# Patient Record
Sex: MALE | Race: WHITE | NOT HISPANIC OR LATINO | Employment: FULL TIME | URBAN - METROPOLITAN AREA
[De-identification: names, ages, dates, MRNs, and addresses within clinical notes are randomized per-mention and may not be internally consistent; named-entity substitution may affect disease eponyms.]

---

## 2018-02-08 ENCOUNTER — OFFICE VISIT (OUTPATIENT)
Dept: FAMILY MEDICINE | Facility: CLINIC | Age: 68
End: 2018-02-08
Payer: COMMERCIAL

## 2018-02-08 VITALS
HEIGHT: 72 IN | BODY MASS INDEX: 22.92 KG/M2 | HEART RATE: 60 BPM | OXYGEN SATURATION: 100 % | DIASTOLIC BLOOD PRESSURE: 68 MMHG | RESPIRATION RATE: 20 BRPM | WEIGHT: 169.25 LBS | SYSTOLIC BLOOD PRESSURE: 123 MMHG | TEMPERATURE: 98.6 F

## 2018-02-08 DIAGNOSIS — Z13.1 SCREENING FOR DIABETES MELLITUS: ICD-10-CM

## 2018-02-08 DIAGNOSIS — Z71.89 ADVANCE CARE PLANNING: ICD-10-CM

## 2018-02-08 DIAGNOSIS — Z13.220 LIPID SCREENING: ICD-10-CM

## 2018-02-08 DIAGNOSIS — Z00.00 ROUTINE GENERAL MEDICAL EXAMINATION AT A HEALTH CARE FACILITY: Primary | ICD-10-CM

## 2018-02-08 DIAGNOSIS — D12.5 ADENOMATOUS POLYP OF SIGMOID COLON: ICD-10-CM

## 2018-02-08 LAB
ANION GAP SERPL CALCULATED.3IONS-SCNC: 6 MMOL/L (ref 3–14)
BUN SERPL-MCNC: 13 MG/DL (ref 7–30)
CALCIUM SERPL-MCNC: 8.9 MG/DL (ref 8.5–10.1)
CHLORIDE SERPL-SCNC: 103 MMOL/L (ref 94–109)
CHOLEST SERPL-MCNC: 210 MG/DL
CO2 SERPL-SCNC: 28 MMOL/L (ref 20–32)
CREAT SERPL-MCNC: 0.71 MG/DL (ref 0.66–1.25)
GFR SERPL CREATININE-BSD FRML MDRD: >90 ML/MIN/1.7M2
GLUCOSE SERPL-MCNC: 84 MG/DL (ref 70–99)
HDLC SERPL-MCNC: 85 MG/DL
LDLC SERPL CALC-MCNC: 113 MG/DL
NONHDLC SERPL-MCNC: 125 MG/DL
POTASSIUM SERPL-SCNC: 3.4 MMOL/L (ref 3.4–5.3)
SODIUM SERPL-SCNC: 137 MMOL/L (ref 133–144)
TRIGL SERPL-MCNC: 60 MG/DL

## 2018-02-08 PROCEDURE — 80061 LIPID PANEL: CPT | Performed by: FAMILY MEDICINE

## 2018-02-08 PROCEDURE — 80048 BASIC METABOLIC PNL TOTAL CA: CPT | Performed by: FAMILY MEDICINE

## 2018-02-08 PROCEDURE — 99387 INIT PM E/M NEW PAT 65+ YRS: CPT | Performed by: FAMILY MEDICINE

## 2018-02-08 PROCEDURE — 36415 COLL VENOUS BLD VENIPUNCTURE: CPT | Performed by: FAMILY MEDICINE

## 2018-02-08 RX ORDER — MULTIPLE VITAMINS W/ MINERALS TAB 9MG-400MCG
1 TAB ORAL DAILY
COMMUNITY
Start: 2018-02-08

## 2018-02-08 ASSESSMENT — ACTIVITIES OF DAILY LIVING (ADL)
CURRENT_FUNCTION: NO ASSISTANCE NEEDED
I_NEED_ASSISTANCE_FOR_THE_FOLLOWING_DAILY_ACTIVITIES:: NO ASSISTANCE IS NEEDED

## 2018-02-08 NOTE — PROGRESS NOTES
SUBJECTIVE:   Noah Wells is a 67 year old male who presents for Preventive Visit.    Are you in the first 12 months of your Medicare Part B coverage?  No    Healthy Habits:  Answers for HPI/ROS submitted by the patient on 2/8/2018   Annual Exam:  Getting at least 3 servings of Calcium per day:: Yes  Bi-annual eye exam:: NO  Dental care twice a year:: Yes  Sleep apnea or symptoms of sleep apnea:: None  Diet:: Regular (no restrictions)  Frequency of exercise:: 4-5 days/week  Taking medications regularly:: Yes  Medication side effects:: Not applicable  Additional concerns today:: No  Activities of Daily Living: no assistance needed  Home safety: no safety concerns identified  Hearing Impairment:: no hearing concerns  PHQ-2 Score: 0  Duration of exercise:: 45-60 minutes      Fallen 2 or more times in the past year?: Yes (due biking )  Any fall with injury in the past year?: No  Timed Up and Go Test (>13.5 is fall risk; contact physician) : 7        COGNITIVE SCREEN  1) Repeat 3 items (Banana, Sunrise, Chair)    2) Clock draw: NORMAL  3) 3 item recall: Recalls 2 objects   Results: NORMAL clock, 1-2 items recalled: COGNITIVE IMPAIRMENT LESS LIKELY    Mini-CogTM Copyright S Yolanda. Licensed by the author for use in [facility]; reprinted with permission ([email or web address]). All rights reserved.            Reviewed and updated as needed this visit by clinical staff  Tobacco  Allergies  Meds  Problems  Med Hx  Surg Hx  Fam Hx  Soc Hx          Reviewed and updated as needed this visit by Provider  Tobacco  Problems  Med Hx  Surg Hx  Fam Hx  Soc Hx       Social History   Substance Use Topics     Smoking status: Former Smoker     Packs/day: 1.00     Years: 12.00     Quit date: 8/29/1988     Smokeless tobacco: Former User     Alcohol use Yes      Comment: occ       If you drink alcohol do you typically have >3 drinks per day or >7 drinks per week? No                        Today's PHQ-2 Score:   PHQ-2  ( 1999 Pfizer) 2/8/2018 2/8/2018   Q1: Little interest or pleasure in doing things 0 0   Q2: Feeling down, depressed or hopeless 0 0   PHQ-2 Score 0 0   Q1: Little interest or pleasure in doing things Not at all -   Q2: Feeling down, depressed or hopeless Not at all -   PHQ-2 Score 0 -       Do you feel safe in your environment - Yes    Do you have a Health Care Directive?: No: Advance care planning reviewed with patient; information given to patient to review.    Current providers sharing in care for this patient include:   Patient Care Team:  Kassie Ríos MD as PCP - General (Family Practice)    The following health maintenance items are reviewed in Epic and correct as of today:  Health Maintenance   Topic Date Due     ADVANCE DIRECTIVE PLANNING Q5 YRS  06/09/2004     FALL RISK ASSESSMENT  06/09/2014     PNEUMOCOCCAL (1 of 2 - PCV13) 06/09/2014     AORTIC ANEURYSM SCREENING (SYSTEM ASSIGNED)  06/09/2014     INFLUENZA VACCINE (SYSTEM ASSIGNED)  09/01/2017     LIPID SCREEN Q5 YR MALE (SYSTEM ASSIGNED)  08/29/2018     COLONOSCOPY Q5 YR  10/11/2018     TETANUS IMMUNIZATION (SYSTEM ASSIGNED)  08/29/2023     HEPATITIS C SCREENING  Completed         ROS:  CONST: NEGATIVE for fevers/chills/sweats, unexplained weight loss/gain, and fatigue  EYES: POSITIVE for change in vision  ENT: NEGATIVE for difficulty hearing/tinnitus, and problems with teeth/gums  BREAST: NEGATIVE for breast lump/discharge  CV: NEGATIVE for chest pain/discomfort, leg pain with exercise, and palpitations  RESP: NEGATIVE for cough/wheeze, and difficulty breathing  GI: NEGATIVE for abdominal pain, blood in bowel movement, and nausea/vomiting/diarrhea  : POSITIVE for nighttime urination (once/noc - chronic and stable), NEGATIVE for leaking urine, penile discharge, and concerns about sexual function  MS: NEGATIVE for muscle/joint pain  SKIN: NEGATIVE for rash or mole change  NEURO: NEGATIVE for headache, dizziness/lightheadedness, numbness, memory  loss, and loss of coordination  PSYCH: NEGATIVE for anxiety/stress, problems with sleep, and depression  HEME: NEGATIVE for unexplained lumps, and easy bruising/bleeding  ENDO: NEGATIVE for excessive thirst or urination  ALL: NEGATIVE for hay fever/allergies     OBJECTIVE:   /68 (BP Location: Right arm, Patient Position: Chair, Cuff Size: Adult Regular)  Pulse 60  Temp 98.6  F (37  C) (Oral)  Resp 20  Ht 6' (1.829 m)  Wt 169 lb 4 oz (76.8 kg)  SpO2 100%  BMI 22.95 kg/m2 Estimated body mass index is 22.95 kg/(m^2) as calculated from the following:    Height as of this encounter: 6' (1.829 m).    Weight as of this encounter: 169 lb 4 oz (76.8 kg).  EXAM:   GENERAL: healthy, alert and no distress  EYES: Eyes grossly normal to inspection, PERRL and conjunctivae and sclerae normal  HENT: ear canals and TM's normal, nose and mouth without ulcers or lesions  NECK: no adenopathy, no asymmetry, masses, or scars and thyroid normal to palpation  RESP: lungs clear to auscultation - no rales, rhonchi or wheezes  CV: regular rate and rhythm, normal S1 S2, no S3 or S4, no murmur, click or rub, no peripheral edema and peripheral pulses strong  ABDOMEN: soft, nontender, no hepatosplenomegaly, no masses and bowel sounds normal   (male): normal male genitalia without lesions or urethral discharge, no hernia  MS: no gross musculoskeletal defects noted, no edema  SKIN: no suspicious lesions or rashes  NEURO: Normal strength and tone, mentation intact and speech normal  PSYCH: mentation appears normal, affect normal/bright    ASSESSMENT / PLAN:     1. Routine general medical examination at a health care facility  He's in excellent physical condition, biking all over for transportation.  I recommended Prevnar which he declined.  Recommended formal eye exam    2. Lipid screening  - Lipid panel reflex to direct LDL Fasting    3. Screening for diabetes mellitus  - Basic metabolic panel    4. Adenomatous polyp of sigmoid  colon  Due for repeat colonoscopy this year.  - COLORECTAL SURGERY REFERRAL    5. Advance care planning  Discussed and recommended Health Care Directive.           End of Life Planning:  Patient currently has an advanced directive: No.  I have verified the patient's ablity to prepare an advanced directive/make health care decisions.  Literature was provided to assist patient in preparing an advanced directive.      COUNSELING:  Reviewed preventive health counseling, as reflected in patient instructions        BP Screening:   Last 3 BP Readings:    BP Readings from Last 3 Encounters:   02/08/18 123/68   09/26/13 127/68   08/29/13 120/72   The following was recommended to the patient:  Re-screen BP within a year and recommended lifestyle modifications        Appropriate preventive services were discussed with this patient, including applicable screening as appropriate for cardiovascular disease, diabetes, osteopenia/osteoporosis, and glaucoma.  As appropriate for age/gender, discussed screening for colorectal cancer, prostate cancer, breast cancer, and cervical cancer. Checklist reviewing preventive services available has been given to the patient.    Reviewed patients plan of care and provided an AVS. The Basic Care Plan (routine screening as documented in Health Maintenance) for Noah meets the Care Plan requirement. This Care Plan has been established and reviewed with the Patient.    Counseling Resources:  ATP IV Guidelines  Pooled Cohorts Equation Calculator  Breast Cancer Risk Calculator  FRAX Risk Assessment  ICSI Preventive Guidelines  Dietary Guidelines for Americans, 2010  USDA's MyPlate  ASA Prophylaxis  Lung CA Screening    Kassie Ríos MD  [facility]

## 2018-02-08 NOTE — MR AVS SNAPSHOT
After Visit Summary   2/8/2018    Noah Wells    MRN: [id number]           Patient Information     Date Of Birth          1950        Visit Information        Provider Department      2/8/2018 9:00 AM Kassie Ríos MD [facility]        Today's Diagnoses     Routine general medical examination at a health care facility    -  1    Lipid screening        Screening for diabetes mellitus        Adenomatous polyp of sigmoid colon          Care Instructions      Preventive Health Recommendations:       Male Ages 65 and over    Yearly exam:             See your health care provider every year in order to  o   Review health changes.   o   Discuss preventive care.    o   Review your medicines if your doctor has prescribed any.    Talk with your health care provider about whether you should have a test to screen for prostate cancer (PSA).    Every 3 years, have a diabetes test (fasting glucose). If you are at risk for diabetes, you should have this test more often.    Every 5 years, have a cholesterol test. Have this test more often if you are at risk for high cholesterol or heart disease.     Every 10 years, have a colonoscopy. Or, have a yearly FIT test (stool test). These exams will check for colon cancer.    Talk to with your health care provider about screening for Abdominal Aortic Aneurysm if you have a family history of AAA or have a history of smoking.  Shots:     Get a flu shot each year.     Get a tetanus shot every 10 years.     Talk to your doctor about your pneumonia vaccines. There are now two you should receive - Pneumovax (PPSV 23) and Prevnar (PCV 13).    Talk to your doctor about a shingles vaccine.     Talk to your doctor about the hepatitis B vaccine.  Nutrition:     Eat at least 5 servings of fruits and vegetables each day.     Eat whole-grain bread, whole-wheat pasta and brown rice instead of white grains and rice.     Talk to your doctor about Calcium and Vitamin  D.   Lifestyle    Exercise for at least 150 minutes a week (30 minutes a day, 5 days a week). This will help you control your weight and prevent disease.     Limit alcohol to one drink per day.     No smoking.     Wear sunscreen to prevent skin cancer.     See your dentist every six months for an exam and cleaning.     See your eye doctor every 1 to 2 years to screen for conditions such as glaucoma, macular degeneration and cataracts.          Follow-ups after your visit        Additional Services     COLORECTAL SURGERY REFERRAL       Your provider has referred you to: [facility]: [facility] [phone]   [email or web address]    Referral Reason(s): Colonoscopy  Special Concerns: None  This referral is: Elective (week +)  It is OK to leave a message on patient's voicemail.    Please be aware that coverage of these services is subject to the terms and limitations of your health insurance plan.  Call member services at your health plan with any benefit or coverage questions.      Please bring the following with you to your appointment:    (1) Any X-Rays, CTs or MRIs which have been performed.  Contact the facility where they were done to arrange for  prior to your scheduled appointment.    (2) List of current medications  (3) This referral request   (4) Any documents/labs given to you for this referral                  Who to contact     If you have questions or need follow up information about today's clinic visit or your schedule please contact [facility] directly at [phone].  Normal or non-critical lab and imaging results will be communicated to you by MyChart, letter or phone within 4 business days after the clinic has received the results. If you do not hear from us within 7 days, please contact the clinic through MyChart or phone. If you have a critical or abnormal lab result, we will notify you by phone as soon as possible.  Submit refill requests  "through [employer] or call your pharmacy and they will forward the refill request to us. Please allow 3 business days for your refill to be completed.          Additional Information About Your Visit        The [employer] Information     [employer] lets you send messages to your doctor, view your test results, renew your prescriptions, schedule appointments and more. To sign up, go to [email or web address] . Click on \"Log in\" on the left side of the screen, which will take you to the Welcome page. Then click on \"Sign up Now\" on the right side of the page.     You will be asked to enter the access code listed below, as well as some personal information. Please follow the directions to create your username and password.     Your access code is: [email or web address]  Expires: 2018  9:47 AM     Your access code will  in 90 days. If you need help or a new code, please call your [place] clinic or [phone].        Care EveryWhere ID     This is your Care EveryWhere ID. This could be used by other organizations to access your [place] medical records  [id number]        Your Vitals Were     Pulse Temperature Respirations Height Pulse Oximetry BMI (Body Mass Index)    60 98.6  F (37  C) (Oral) 20 6' (1.829 m) 100% 22.95 kg/m2       Blood Pressure from Last 3 Encounters:   18 123/68   13 127/68   13 120/72    Weight from Last 3 Encounters:   18 169 lb 4 oz (76.8 kg)   13 173 lb 1.6 oz (78.5 kg)   13 168 lb (76.2 kg)              We Performed the Following     Basic metabolic panel     COLORECTAL SURGERY REFERRAL     Lipid panel reflex to direct LDL Fasting          Today's Medication Changes          These changes are accurate as of 18  9:47 AM.  If you have any questions, ask your nurse or doctor.               Stop taking these medicines if you haven't already. Please contact your care team if you have questions.     bisacodyl 5 MG EC tablet   Commonly known as:  DULCOLAX   Stopped by:  " Kassie Ríos MD           magnesium citrate solution   Stopped by:  Kassie Ríos MD           PEG-KCl-NaCl-NaSulf-Na Asc-C 100 G Solr   Commonly known as:  MOVIPREP   Stopped by:  Kassie Ríos MD           simethicone 80 MG chewable tablet   Commonly known as:  MYLICON   Stopped by:  Kassie Ríos MD           triamcinolone 0.1 % cream   Commonly known as:  KENALOG   Stopped by:  Kassie Ríos MD                    Primary Care Provider Office Phone # Fax #    Kassie Ríos [phone] [address]  [facility] [address]        Equal Access to Services     AMELIA SANDOVAL : Jordan rowland Somallika, waaxda luqadaha, qaybta kaalmada adebijalyajen, jc zhou . [facility] [phone].    ATENCIÓN: Si habla español, tiene a orlando disposición servicios gratuitos de asistencia lingüística. Llame al [phone].    We comply with applicable federal civil rights laws and [place] laws. We do not discriminate on the basis of race, color, national origin, age, disability, sex, sexual orientation, or gender identity.            Thank you!     Thank you for choosing [facility]  for your care. Our goal is always to provide you with excellent care. Hearing back from our patients is one way we can continue to improve our services. Please take a few minutes to complete the written survey that you may receive in the mail after your visit with us. Thank you!             Your Updated Medication List - Protect others around you: Learn how to safely use, store and throw away your medicines at www.disposemymeds.org.          This list is accurate as of 2/8/18  9:47 AM.  Always use your most recent med list.                   Brand Name Dispense Instructions for use Diagnosis    B COMPLEX PO           FISH OIL           Vitamin C 500 MG Caps

## 2018-02-08 NOTE — LETTER
February 9, 2018      Noah Wells  [address]  [place] [address]        Dear ,    We are writing to inform you of your test results.    Your basic metabolic panel (blood salts, blood sugar, and kidney function) and your lipid panel (cholesterol) results are all excellent!  In particular, your HDL (good) cholesterol is higher than last time and your LDL (bad) cholesterol is lower than last time.  Good job!    Resulted Orders   Lipid panel reflex to direct LDL Fasting   Result Value Ref Range    Cholesterol 210 (H) <200 mg/dL      Comment:      Desirable:       <200 mg/dl    Triglycerides 60 <150 mg/dL      Comment:      Fasting specimen    HDL Cholesterol 85 >39 mg/dL    LDL Cholesterol Calculated 113 (H) <100 mg/dL      Comment:      Above desirable:  100-129 mg/dl  Borderline High:  130-159 mg/dL  High:             160-189 mg/dL  Very high:       >189 mg/dl      Non HDL Cholesterol 125 <130 mg/dL   Basic metabolic panel   Result Value Ref Range    Sodium 137 133 - 144 mmol/L    Potassium 3.4 3.4 - 5.3 mmol/L    Chloride 103 94 - 109 mmol/L    Carbon Dioxide 28 20 - 32 mmol/L    Anion Gap 6 3 - 14 mmol/L    Glucose 84 70 - 99 mg/dL      Comment:      Fasting specimen    Urea Nitrogen 13 7 - 30 mg/dL    Creatinine 0.71 0.66 - 1.25 mg/dL    GFR Estimate >90 >60 mL/min/1.7m2      Comment:      Non  GFR Calc    GFR Estimate If Black >90 >60 mL/min/1.7m2      Comment:       GFR Calc    Calcium 8.9 8.5 - 10.1 mg/dL       If you have any questions or concerns, please call the clinic at the number listed above.       Sincerely,        Kassie Ríos MD/nr

## 2018-02-09 NOTE — PROGRESS NOTES
Please send results letter:  Your basic metabolic panel (blood salts, blood sugar, and kidney function) and your lipid panel (cholesterol) results are all excellent!  In particular, your HDL (good) cholesterol is higher than last time and your LDL (bad) cholesterol is lower than last time.  Good job!  Kassie Ríos MD

## 2018-06-29 ENCOUNTER — TELEPHONE (OUTPATIENT)
Dept: FAMILY MEDICINE | Facility: CLINIC | Age: 68
End: 2018-06-29

## 2018-06-29 DIAGNOSIS — D23.60 BENIGN NEOPLASM OF SKIN OF UPPER EXTREMITY AND SHOULDER, UNSPECIFIED LATERALITY: Primary | ICD-10-CM

## 2018-06-29 NOTE — TELEPHONE ENCOUNTER
"LOV: Referral ady'd up.       \"Pt is calling requesting a referral for derm for a growth on his R shoulder. He wanted to sched an appt with Dr. Ríos for her to remove it and writer stated there is no guarantee the growth would be removed at appt. PCP discretion. Appt could be a consult or pt may get referred else where. He doesn't want to do multiple appts.\"     Dr. Ríos do you agree with referral?    Thanks! Merna Yip RN    "

## 2018-06-29 NOTE — TELEPHONE ENCOUNTER
Reason for Call: Request for an order or referral:    Order or referral being requested: Pt is calling requesting a referral for derm for a growth on his R shoulder. He wanted to sched an appt with Dr. Ríos for her to remove it and writer stated there is no guarantee the growth would be removed at appt. PCP discretion. Appt could be a consult or pt may get referred else where. He doesn't want to do multiple appts.     Date needed: as soon as possible    Has the patient been seen by the PCP for this problem? NO    Additional comments: NA    Phone number Patient can be reached at:  Cell number on file:    Telephone Information:   Mobile [phone]       Best Time:  anytime    Can we leave a detailed message on this number?  YES    Call taken on 6/29/2018 at 11:00 AM by Yazmin Paul

## 2018-07-05 NOTE — TELEPHONE ENCOUNTER
,      Please let pt know he can call derm to schedule. Options below      Your provider has referred you to: [facility]: Dermatology Clinic [facility] [phone]   [email or web address]  [facility]: town Dermatology [facility] [phone]  [email or web address]  [facility]: [facility] [place] [phone]   [email or web address]  [facility]: [facility] [place] [phone]   [email or web address]          Thank you,  Kiana Hagen RN

## 2019-12-15 ENCOUNTER — HEALTH MAINTENANCE LETTER (OUTPATIENT)
Age: 69
End: 2019-12-15

## 2020-08-12 ENCOUNTER — NURSE TRIAGE (OUTPATIENT)
Dept: FAMILY MEDICINE | Facility: CLINIC | Age: 70
End: 2020-08-12

## 2020-08-12 ENCOUNTER — OFFICE VISIT (OUTPATIENT)
Dept: URGENT CARE | Facility: URGENT CARE | Age: 70
End: 2020-08-12
Payer: OTHER MISCELLANEOUS

## 2020-08-12 VITALS
RESPIRATION RATE: 18 BRPM | DIASTOLIC BLOOD PRESSURE: 74 MMHG | OXYGEN SATURATION: 99 % | HEART RATE: 69 BPM | SYSTOLIC BLOOD PRESSURE: 132 MMHG | TEMPERATURE: 98.1 F | WEIGHT: 172.8 LBS | BODY MASS INDEX: 23.44 KG/M2

## 2020-08-12 DIAGNOSIS — T14.8XXA PUNCTURE WOUND: ICD-10-CM
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
## 2020-08-12 DIAGNOSIS — S61.452A DOG BITE OF LEFT HAND, INITIAL ENCOUNTER: Primary | ICD-10-CM

## 2020-08-12 PROCEDURE — 99214 OFFICE O/P EST MOD 30 MIN: CPT | Performed by: FAMILY MEDICINE

## 2020-08-12 RX ORDER — DOXYCYCLINE 100 MG/1
100 CAPSULE ORAL 2 TIMES DAILY
Qty: 14 CAPSULE | Refills: 0 | Status: SHIPPED | OUTPATIENT
Start: 2020-08-12 | End: 2020-08-19

## 2020-08-12 RX ORDER — CLINDAMYCIN HCL 300 MG
300 CAPSULE ORAL 3 TIMES DAILY
Qty: 21 CAPSULE | Refills: 0 | Status: SHIPPED | OUTPATIENT
Start: 2020-08-12 | End: 2020-08-19

## 2020-08-12 NOTE — TELEPHONE ENCOUNTER
"Pt was directed to [facility] at 9 am this am.  Pt got bleeding stopped last night.  They can assess hand. If needed, pt can be directed to ER.   PT agreed with plan. Gave address and hours for .  JAMARCUS Flores    Reason for Disposition    Any break in skin from BITE (e.g., cut, puncture, or scratch) and monkey    Additional Information    Negative: Major bleeding (actively dripping or spurting) that can't be stopped    Negative: Sounds like a life-threatening emergency to the triager    Sounds like a serious bite injury to the triager    Cut (length > 1/8 inch or 3 mm) or skin tear and any animal    Answer Assessment - Initial Assessment Questions  1. ANIMAL: \"What type of animal caused the bite?\" \"Is the injury from a bite or a claw?\" If the animal is a dog or a cat, ask: \"Was it a pet or a stray?\" \"Was it acting ill or behaving strangely?\"      Dog bite last night about 10 pm. A rescue dog in foster care got off his collar.  2. LOCATION: \"Where is the bite located?\"       Hand has 3 puncture bites. May involve a hand joint.  3. SIZE: \"How big is the bite?\" \"What does it look like?\"       Possible 3 puncture bites.  4. ONSET: \"When did the bite happen?\" (Minutes or hours ago)       10 pm on 8/11/2020.  5. CIRCUMSTANCES: \"Tell me how this happened.\"       See above. Dog was captured by animal control. They have dog.  6. TETANUS: \"When was the last tetanus booster?\"      8/29/2013  7. PREGNANCY: \"Is there any chance you are pregnant?\" \"When was your last menstrual period?\"      no    Protocols used: ANIMAL BITE-A-OH    "

## 2020-08-12 NOTE — PATIENT INSTRUCTIONS
Patient Education     Animal Bite (General)  An animal bite can cause a wound deep enough to break the skin. In such cases, the wound is cleaned and then sometimes closed. If the wound is closed, it may not be closed completely. This is so that fluid can drain and prevent the wound from becoming infected. In addition to wound care, a tetanus shot may be given, if needed.    Home care    Care for the wound as directed. If a dressing was applied to the wound, be sure to change it as directed.    Wash your hands well with soap and warm water before and after caring for the wound. This helps lower the risk of infection.    If the wound bleeds, place a clean, soft cloth on the wound. Then firmly apply pressure until the bleeding stops. This may take up to 5 minutes. Do not release the pressure and look at the wound during this time.    Most skin wounds heal within 10 days. But an infection can occur even with proper treatment. So be sure to watch the wound for signs of infection (see below). Check the wound as often as directed by your healthcare provider.    Antibiotics may be prescribed. These help prevent or treat infection. If you re given antibiotics, take them as directed. Also be sure to complete the medicines.  Rabies prevention  Rabies is a virus that can be carried in certain animals. These can include domestic animals such as dogs and cats. Wild animals such as skunks, raccoons, foxes, and bats can also carry rabies. Pets fully vaccinated against rabies (2 shots) are at very low risk of infection. But because human rabies is almost always fatal, any biting pet should be confined for 10 days as an extra precaution. In general, if there is a risk for rabies, the following steps may need to be taken:    If someone s pet dog or cat has bitten you, it should be kept in a secure area for the next 10 days to watch for signs of illness. (If the pet owner won t allow this, contact your local animal control center.)  If the dog or cat becomes ill or dies during that time, contact your local animal control center at once so the animal may be tested for rabies. If the pet stays healthy for the next 10 days, there is no danger of rabies in the animal or you.    If a stray pet bit you, contact your local animal control center. They can give information on capture, quarantine, and animal rabies testing.    If you can t find the animal that bit you in the next 2 days, and if rabies exists in your region, you may need to receive the rabies vaccine series. Call your healthcare provider right away. Or return to the emergency department promptly.    All animal bites should be reported to the local animal control center. If you were not given a form to fill out, you can report this yourself.  Follow-up care  Follow up with your healthcare provider, or as directed.  When to seek medical advice  Call your healthcare provider right away if any of these occur:    Signs of infection:  ? Spreading redness or warmth from the wound  ? Increased pain or swelling  ? Fever of 100.4 F (38 C) or higher, or as directed by your healthcare provider  ? Colored fluid or pus draining from the wound    Signs of rabies infection:  ? Headache  ? Confusion  ? Strange behavior  ? Increased salivating and drooling  ? Seizure    Decreased ability to move any body part near the bite area    Bleeding that can't be stopped after 5 minutes of firm pressure  Date Last Reviewed: 3/1/2017    7610-4724 [employer]. [address] [place] [address], [place] [address]. All rights reserved. This information is not intended as a substitute for professional medical care. Always follow your healthcare professional's instructions.

## 2020-08-12 NOTE — PROGRESS NOTES
Chief Complaint   Patient presents with     Dog Bite     Last night left hand       Work comp  SUBJECTIVE:  Noah Wells is a 70 year old male who presents with a chief complaint of an animal bite on the hands left.  He was bitten by a dog yesterday.   Cicumstances of bite: approached animal.  Severity: moderate, bite with skin break.  Animal's immunizations up to date   Associated symptoms: increasing pain , swelling and edema at site    last tetanus booster within 10 years    Past Medical History:   Diagnosis Date     Allergic rhinitis      Heel fracture     fall from a tree - bilateral heel fractures requiring surgery       Current Outpatient Medications:      Ascorbic Acid (VITAMIN C) 500 MG CAPS, , Disp: , Rfl:      B Complex Vitamins (B COMPLEX PO), , Disp: , Rfl:      clindamycin (CLEOCIN) 300 MG capsule, Take 1 capsule (300 mg) by mouth 3 times daily for 7 days, Disp: 21 capsule, Rfl: 0     doxycycline hyclate (VIBRAMYCIN) 100 MG capsule, Take 1 capsule (100 mg) by mouth 2 times daily for 7 days, Disp: 14 capsule, Rfl: 0     FISH OIL, , Disp: , Rfl:      multivitamin, therapeutic with minerals (MULTI-VITAMIN) TABS tablet, Take 1 tablet by mouth daily, Disp: , Rfl:   Social History     Tobacco Use     Smoking status: Former Smoker     Packs/day: 1.00     Years: 12.00     Pack years: 12.00     Last attempt to quit: 1988     Years since quittin.9     Smokeless tobacco: Former User   Substance Use Topics     Alcohol use: Yes     Comment: occ       ROS:  10 point ROS of systems including Constitutional, Eyes, Respiratory, Cardiovascular, Gastroenterology, Genitourinary,  Muscularskeletal, Psychiatric were all negative except for pertinent positives noted in my HPI           OBJECTIVE:  /74   Pulse 69   Temp 98.1  F (36.7  C) (Tympanic)   Resp 18   Wt 78.4 kg (172 lb 12.8 oz)   SpO2 99%   BMI 23.44 kg/m    GENERAL: healthy, alert no acute distress  SKIN: left hand, there is skin abrasion  noted over the left index finger dorsal aspect, puncture wound noted in the thumb dorsum and palmar aspect overlying the first metacarpal phalangeal joint, swelling, tenderness to palpation and decreased ROM  of fingers and hands left, he could move his thumb both flexion and extension of the thumbs possible pain associated with it there was no streaking noted  Discussed with patient about the findings  Cleaned area with antiseptic solution and thoroughly cleaned the puncture wound and the patient  Area dressed with Telfa and Coban    MS:extremities normal- no gross deformities noted,  FROM noted in all extremities  NEURO: Normal strength and tone, sensory exam grossly normal,  normal speech and mentation    D/d- dog bite , soft tissue infection, soft tissue inflammation , puncture wound   ASSESSMENT:  Animal bite  soft tissue contusion  laceration  puncture wound    Noah was seen today for dog bite.    Diagnoses and all orders for this visit:    Dog bite of left hand, initial encounter  -     doxycycline hyclate (VIBRAMYCIN) 100 MG capsule; Take 1 capsule (100 mg) by mouth 2 times daily for 7 days  -     clindamycin (CLEOCIN) 300 MG capsule; Take 1 capsule (300 mg) by mouth 3 times daily for 7 days    Puncture wound        PLAN:  See orders in epic  Continue on tylenol or ibuprofen for the pain    As he is allergic to penicillin would be treating with 2 antibiotic   Discussed if pain and swelling does not get better in next 6 hrs should follow up in the ER   Pt was given detailed hand out on animal bite   Follow up if  symptoms fail to improve or worsens   Pt understood and agreed with plan     did spent>35 minutes with patient and > 50% of the time was for answering questions, discussing findings, counseling and coordination of care       Becca Maurice MD

## 2021-04-13 ENCOUNTER — IMMUNIZATION (OUTPATIENT)
Dept: NURSING | Facility: CLINIC | Age: 71
End: 2021-04-13
Payer: COMMERCIAL

## 2021-04-13 PROCEDURE — 0001A PR COVID VAC PFIZER DIL RECON 30 MCG/0.3 ML IM: CPT

## 2021-04-13 PROCEDURE — 91300 PR COVID VAC PFIZER DIL RECON 30 MCG/0.3 ML IM: CPT

## 2021-05-04 ENCOUNTER — IMMUNIZATION (OUTPATIENT)
Dept: NURSING | Facility: CLINIC | Age: 71
End: 2021-05-04
Attending: INTERNAL MEDICINE
Payer: COMMERCIAL

## 2021-05-04 PROCEDURE — 91300 PR COVID VAC PFIZER DIL RECON 30 MCG/0.3 ML IM: CPT

## 2021-05-04 PROCEDURE — 0002A PR COVID VAC PFIZER DIL RECON 30 MCG/0.3 ML IM: CPT

## 2021-09-11 ENCOUNTER — ALLIED HEALTH/NURSE VISIT (OUTPATIENT)
Dept: FAMILY MEDICINE | Facility: OTHER | Age: 71
End: 2021-09-11
Attending: FAMILY MEDICINE
Payer: COMMERCIAL

## 2021-09-11 ENCOUNTER — NURSE TRIAGE (OUTPATIENT)
Dept: NURSING | Facility: CLINIC | Age: 71
End: 2021-09-11

## 2021-09-11 DIAGNOSIS — Z20.822 EXPOSURE TO 2019 NOVEL CORONAVIRUS: Primary | ICD-10-CM

## 2021-09-11 PROCEDURE — U0003 INFECTIOUS AGENT DETECTION BY NUCLEIC ACID (DNA OR RNA); SEVERE ACUTE RESPIRATORY SYNDROME CORONAVIRUS 2 (SARS-COV-2) (CORONAVIRUS DISEASE [COVID-19]), AMPLIFIED PROBE TECHNIQUE, MAKING USE OF HIGH THROUGHPUT TECHNOLOGIES AS DESCRIBED BY CMS-2020-01-R: HCPCS | Mod: ZL

## 2021-09-11 PROCEDURE — C9803 HOPD COVID-19 SPEC COLLECT: HCPCS

## 2021-09-11 NOTE — TELEPHONE ENCOUNTER
Patient called for covid test due to exposure at work.     Per protocol patient to go to urgent care or clinic for covid test option during weekend. Given home care advice per protocol and when to call back.Patient verbalized understanding and agrees to plan of care.    Jessie Toscano RN   09/11/21 2:34 AM  [facility] Nurse Advisor    COVID 19 Nurse Triage Plan/Patient Instructions    Please be aware that novel coronavirus (COVID-19) may be circulating in the community. If you develop symptoms such as fever, cough, or SOB or if you have concerns about the presence of another infection including coronavirus (COVID-19), please contact your health care provider or visit [email or web address].     Disposition/Instructions    In-Person Visit with provider recommended. Reference Visit Selection Guide.    Thank you for taking steps to prevent the spread of this virus.  o Limit your contact with others.  o Wear a simple mask to cover your cough.  o Wash your hands well and often.    Resources    [facility]: About COVID-19: [email or web address]    CDC: What to Do If You're Sick: www.cdc.gov/coronavirus/2019-ncov/about/steps-when-sick.html    CDC: Ending Home Isolation: www.cdc.gov/coronavirus/2019-ncov/hcp/disposition-in-home-patients.html     CDC: Caring for Someone: www.cdc.gov/coronavirus/2019-ncov/if-you-are-sick/care-for-someone.html     [facility]: Interim Guidance for Hospital Discharge to Home: www.health.[facility].[place]/diseases/coronavirus/hcp/hospdischarge.pdf    [facility] clinical trials (COVID-19 research studies): [email or web address].[email or web address]/[email or web address]-trials     Below are the COVID-19 hotlines at the [facility] ([facility]). Interpreters are available.   o For health questions: Call 270-947-8768 or 1-259.617.3902 (7 a.m. to 7 p.m.)  o For questions about schools and childcare: Call 857-115-2768 or 1-638.218.2082 (7 a.m. to 7 p.m.)                     Reason for  Disposition    [1] CLOSE CONTACT COVID-19 EXPOSURE within last 14 days AND [2] needs COVID-19 lab test to return to work AND [3] NO symptoms    Protocols used: CORONAVIRUS (COVID-19) EXPOSURE-A- 3.25

## 2021-09-12 LAB — SARS-COV-2 RNA RESP QL NAA+PROBE: NEGATIVE

## 2022-02-11 ASSESSMENT — ENCOUNTER SYMPTOMS
EYE PAIN: 0
FEVER: 0
NERVOUS/ANXIOUS: 0
NAUSEA: 0
HEMATURIA: 0
DYSURIA: 0
SHORTNESS OF BREATH: 0
ABDOMINAL PAIN: 0
CONSTIPATION: 0
WEAKNESS: 0
HEADACHES: 0
CHILLS: 0
ARTHRALGIAS: 0
SORE THROAT: 0
HEMATOCHEZIA: 0
COUGH: 0
MYALGIAS: 0
DIZZINESS: 0
DIARRHEA: 0
PALPITATIONS: 0
FREQUENCY: 0
PARESTHESIAS: 1
HEARTBURN: 0
JOINT SWELLING: 0

## 2022-02-11 ASSESSMENT — ACTIVITIES OF DAILY LIVING (ADL): CURRENT_FUNCTION: NO ASSISTANCE NEEDED

## 2022-02-18 ENCOUNTER — OFFICE VISIT (OUTPATIENT)
Dept: FAMILY MEDICINE | Facility: CLINIC | Age: 72
End: 2022-02-18
Payer: COMMERCIAL

## 2022-02-18 VITALS
OXYGEN SATURATION: 99 % | TEMPERATURE: 97.4 F | HEART RATE: 85 BPM | WEIGHT: 171 LBS | SYSTOLIC BLOOD PRESSURE: 120 MMHG | HEIGHT: 72 IN | DIASTOLIC BLOOD PRESSURE: 64 MMHG | BODY MASS INDEX: 23.16 KG/M2

## 2022-02-18 DIAGNOSIS — I77.819 AORTIC ECTASIA (H): ICD-10-CM

## 2022-02-18 DIAGNOSIS — Z13.220 LIPID SCREENING: ICD-10-CM

## 2022-02-18 DIAGNOSIS — Z12.5 SCREENING FOR PROSTATE CANCER: ICD-10-CM

## 2022-02-18 DIAGNOSIS — Z13.6 ENCOUNTER FOR ABDOMINAL AORTIC ANEURYSM (AAA) SCREENING: ICD-10-CM

## 2022-02-18 DIAGNOSIS — Z28.21 IMMUNIZATION DECLINED: ICD-10-CM

## 2022-02-18 DIAGNOSIS — Z12.83 SKIN CANCER SCREENING: ICD-10-CM

## 2022-02-18 DIAGNOSIS — Z12.11 COLON CANCER SCREENING: ICD-10-CM

## 2022-02-18 DIAGNOSIS — D22.9 CHANGE IN MOLE: ICD-10-CM

## 2022-02-18 DIAGNOSIS — Z00.00 MEDICARE ANNUAL WELLNESS VISIT, SUBSEQUENT: ICD-10-CM

## 2022-02-18 DIAGNOSIS — B35.1 ONYCHOMYCOSIS: ICD-10-CM

## 2022-02-18 LAB
ALBUMIN SERPL-MCNC: 3.7 G/DL (ref 3.4–5)
ALP SERPL-CCNC: 83 U/L (ref 40–150)
ALT SERPL W P-5'-P-CCNC: 17 U/L (ref 0–70)
ANION GAP SERPL CALCULATED.3IONS-SCNC: 9 MMOL/L (ref 3–14)
AST SERPL W P-5'-P-CCNC: 18 U/L (ref 0–45)
BILIRUB SERPL-MCNC: 0.4 MG/DL (ref 0.2–1.3)
BUN SERPL-MCNC: 19 MG/DL (ref 7–30)
CALCIUM SERPL-MCNC: 9.2 MG/DL (ref 8.5–10.1)
CHLORIDE BLD-SCNC: 110 MMOL/L (ref 94–109)
CHOLEST SERPL-MCNC: 207 MG/DL
CO2 SERPL-SCNC: 21 MMOL/L (ref 20–32)
CREAT SERPL-MCNC: 0.76 MG/DL (ref 0.66–1.25)
ERYTHROCYTE [DISTWIDTH] IN BLOOD BY AUTOMATED COUNT: 12.9 % (ref 10–15)
FASTING STATUS PATIENT QL REPORTED: YES
GFR SERPL CREATININE-BSD FRML MDRD: >90 ML/MIN/1.73M2
GLUCOSE BLD-MCNC: 92 MG/DL (ref 70–99)
HCT VFR BLD AUTO: 41.7 % (ref 40–53)
HDLC SERPL-MCNC: 89 MG/DL
HGB BLD-MCNC: 13.8 G/DL (ref 13.3–17.7)
LDLC SERPL CALC-MCNC: 102 MG/DL
MCH RBC QN AUTO: 30.1 PG (ref 26.5–33)
MCHC RBC AUTO-ENTMCNC: 33.1 G/DL (ref 31.5–36.5)
MCV RBC AUTO: 91 FL (ref 78–100)
NONHDLC SERPL-MCNC: 118 MG/DL
PLATELET # BLD AUTO: 196 10E3/UL (ref 150–450)
POTASSIUM BLD-SCNC: 4.1 MMOL/L (ref 3.4–5.3)
PROT SERPL-MCNC: 7 G/DL (ref 6.8–8.8)
PSA SERPL-MCNC: 1.77 UG/L (ref 0–4)
RBC # BLD AUTO: 4.58 10E6/UL (ref 4.4–5.9)
SODIUM SERPL-SCNC: 140 MMOL/L (ref 133–144)
TRIGL SERPL-MCNC: 78 MG/DL
WBC # BLD AUTO: 5.2 10E3/UL (ref 4–11)

## 2022-02-18 PROCEDURE — G0438 PPPS, INITIAL VISIT: HCPCS | Performed by: FAMILY MEDICINE

## 2022-02-18 PROCEDURE — 99213 OFFICE O/P EST LOW 20 MIN: CPT | Mod: 25 | Performed by: FAMILY MEDICINE

## 2022-02-18 PROCEDURE — 36415 COLL VENOUS BLD VENIPUNCTURE: CPT | Performed by: FAMILY MEDICINE

## 2022-02-18 PROCEDURE — 85027 COMPLETE CBC AUTOMATED: CPT | Performed by: FAMILY MEDICINE

## 2022-02-18 PROCEDURE — 80061 LIPID PANEL: CPT | Performed by: FAMILY MEDICINE

## 2022-02-18 PROCEDURE — G0103 PSA SCREENING: HCPCS | Performed by: FAMILY MEDICINE

## 2022-02-18 PROCEDURE — 80053 COMPREHEN METABOLIC PANEL: CPT | Performed by: FAMILY MEDICINE

## 2022-02-18 RX ORDER — EFINACONAZOLE 100 MG/ML
SOLUTION TOPICAL DAILY
Qty: 8 ML | Refills: 1 | Status: SHIPPED | OUTPATIENT
Start: 2022-02-18 | End: 2023-01-20

## 2022-02-18 ASSESSMENT — ACTIVITIES OF DAILY LIVING (ADL): CURRENT_FUNCTION: NO ASSISTANCE NEEDED

## 2022-02-18 ASSESSMENT — ENCOUNTER SYMPTOMS
PARESTHESIAS: 1
SHORTNESS OF BREATH: 0
CHILLS: 0
HEARTBURN: 0
FREQUENCY: 0
WEAKNESS: 0
CONSTIPATION: 0
SORE THROAT: 0
NAUSEA: 0
ABDOMINAL PAIN: 0
NERVOUS/ANXIOUS: 0
JOINT SWELLING: 0
DIZZINESS: 0
PALPITATIONS: 0
HEMATURIA: 0
ARTHRALGIAS: 0
COUGH: 0
DYSURIA: 0
EYE PAIN: 0
HEADACHES: 0
MYALGIAS: 0
DIARRHEA: 0
HEMATOCHEZIA: 0
FEVER: 0

## 2022-02-18 NOTE — PROGRESS NOTES
"SUBJECTIVE:   Noah Wells is a 71 year old male who presents for Preventive Visit.    Patient has been advised of split billing requirements and indicates understanding: Yes  Are you in the first 12 months of your Medicare coverage?  No    Healthy Habits:     In general, how would you rate your overall health?  Good    Frequency of exercise:  4-5 days/week    Duration of exercise:  30-45 minutes    Do you usually eat at least 4 servings of fruit and vegetables a day, include whole grains    & fiber and avoid regularly eating high fat or \"junk\" foods?  Yes    Taking medications regularly:  Not Applicable    Medication side effects:  Not applicable    Ability to successfully perform activities of daily living:  No assistance needed    Home Safety:  Throw rugs in the hallway and lack of grab bars in the bathroom    Hearing Impairment:  No hearing concerns    In the past 6 months, have you been bothered by leaking of urine?  No    In general, how would you rate your overall mental or emotional health?  Good      PHQ-2 Total Score: 0    Additional concerns today:  Yes      Change in mole in his back.     He thinks that he had COVID Feb 2020. He would like to check antibody testing.     Right fingers 4-5th digit have fungus along with some toes.     Do you feel safe in your environment? Yes    Have you ever done Advance Care Planning? (For example, a Health Directive, POLST, or a discussion with a medical provider or your loved ones about your wishes): No, advance care planning information given to patient to review.  Patient declined advance care planning discussion at this time.       Fall risk  Fallen 2 or more times in the past year?: No  Any fall with injury in the past year?: No    Cognitive Screening   1) Repeat 3 items (Leader, Season, Table)    2) Clock draw: NORMAL  3) 3 item recall: Recalls 1 object   Results: NORMAL clock, 1-2 items recalled: COGNITIVE IMPAIRMENT LESS LIKELY    Mini-CogTM Copyright S " Yolanda. Licensed by the author for use in [facility]; reprinted with permission (julio@[facility]). All rights reserved.          Reviewed and updated as needed this visit by clinical staff   Tobacco  Allergies  Meds   Med Hx  Surg Hx  Fam Hx  Soc Hx        Reviewed and updated as needed this visit by Provider                 Social History     Tobacco Use     Smoking status: Former Smoker     Packs/day: 1.00     Years: 12.00     Pack years: 12.00     Quit date: 1988     Years since quittin.4     Smokeless tobacco: Former User   Substance Use Topics     Alcohol use: Yes     Comment: occ       If you drink alcohol do you typically have >3 drinks per day or >7 drinks per week? No    Alcohol Use 2022   Prescreen: >3 drinks/day or >7 drinks/week? No   Prescreen: >3 drinks/day or >7 drinks/week? -       Current providers sharing in care for this patient include:  Patient Care Team:  Kassie Ríos MD as PCP - General (Family Practice)    The following health maintenance items are reviewed in Epic and correct as of today:  Health Maintenance Due   Topic Date Due     ANNUAL REVIEW OF HM ORDERS  Never done     ZOSTER IMMUNIZATION (1 of 2) Never done     Pneumococcal Vaccine: 65+ Years (1 of 1 - PPSV23) Never done     AORTIC ANEURYSM SCREENING (SYSTEM ASSIGNED)  Never done     COLORECTAL CANCER SCREENING  10/11/2018     FALL RISK ASSESSMENT  2019     INFLUENZA VACCINE (1) Never done     COVID-19 Vaccine (3 - Booster for Pfizer series) 10/04/2021         Review of Systems   Constitutional: Negative for chills and fever.   HENT: Positive for congestion. Negative for ear pain, hearing loss and sore throat.    Eyes: Negative for pain and visual disturbance.   Respiratory: Negative for cough and shortness of breath.    Cardiovascular: Negative for chest pain, palpitations and peripheral edema.   Gastrointestinal: Negative for abdominal pain, constipation, diarrhea, heartburn, hematochezia  "and nausea.   Genitourinary: Positive for impotence. Negative for dysuria, frequency, genital sores, hematuria, penile discharge and urgency.   Musculoskeletal: Negative for arthralgias, joint swelling and myalgias.   Skin: Negative for rash.   Neurological: Positive for paresthesias. Negative for dizziness, weakness and headaches.   Psychiatric/Behavioral: Negative for mood changes. The patient is not nervous/anxious.      OBJECTIVE:   BP (!) 157/77 (BP Location: Right arm, Patient Position: Chair, Cuff Size: Adult Regular)   Pulse 85   Temp 97.4  F (36.3  C) (Temporal)   Ht 1.835 m (6' 0.25\")   Wt 77.6 kg (171 lb)   SpO2 99%   BMI 23.03 kg/m   Estimated body mass index is 23.03 kg/m  as calculated from the following:    Height as of this encounter: 1.835 m (6' 0.25\").    Weight as of this encounter: 77.6 kg (171 lb).   BP (!) 151/81   Pulse 85   Temp 97.4  F (36.3  C) (Temporal)   Ht 1.835 m (6' 0.25\")   Wt 77.6 kg (171 lb)   SpO2 99%   BMI 23.03 kg/m     /64 (BP Location: Right arm, Patient Position: Chair, Cuff Size: Adult Regular)   Pulse 85   Temp 97.4  F (36.3  C) (Temporal)   Ht 1.835 m (6' 0.25\")   Wt 77.6 kg (171 lb)   SpO2 99%   BMI 23.03 kg/m    Physical Exam  GENERAL: healthy, alert and no distress  EYES: Eyes grossly normal to inspection, conjunctivae and sclerae normal  HENT: ear canals and TM's normal  NECK: no adenopathy, no asymmetry, masses, or scars and thyroid normal to palpation  RESP: lungs clear to auscultation - no rales, rhonchi or wheezes  CV: regular rate and rhythm, normal S1 S2  ABDOMEN: soft, nontender, no hepatosplenomegaly, no masses and bowel sounds normal  MS: no gross musculoskeletal defects noted, no edema  SKIN: right 4-5 th fingers with yellowish discoloration   NEURO: Normal strength and tone, mentation intact and speech normal  PSYCH: mentation appears normal, affect normal    Diagnostic Test Results:  Labs reviewed in Epic    ASSESSMENT / PLAN:     1. " "Medicare annual wellness visit, subsequent  - CBC with platelets; Future  - Comprehensive metabolic panel (BMP + Alb, Alk Phos, ALT, AST, Total. Bili, TP); Future  - CBC with platelets  - Comprehensive metabolic panel (BMP + Alb, Alk Phos, ALT, AST, Total. Bili, TP)    2. Change in mole  - referred to dermatology for further evaluation   - Adult Dermatology Referral; Future    3. Onychomycosis  - pt not interested in oral medication  - sent in tx for topical medication   - Efinaconazole (JUBLIA) 10 % SOLN; Externally apply topically daily  Dispense: 8 mL; Refill: 1    4. Skin cancer screening  - Adult Dermatology Referral; Future    5. Encounter for abdominal aortic aneurysm (AAA) screening  - US Abdominal Aorta Imaging; Future    6. Colon cancer screening  - Adult Gastro Ref - Procedure Only; Future    7. Lipid screening  - Lipid Profile (Chol, Trig, HDL, LDL calc); Future  - Lipid Profile (Chol, Trig, HDL, LDL calc)    8. Screening for prostate cancer  - PSA, screen; Future  - PSA, screen    9. Immunization declined  - declined all immunizations      Patient has been advised of split billing requirements and indicates understanding: Yes    COUNSELING:  Reviewed preventive health counseling, as reflected in patient instructions    Estimated body mass index is 23.03 kg/m  as calculated from the following:    Height as of this encounter: 1.835 m (6' 0.25\").    Weight as of this encounter: 77.6 kg (171 lb).        He reports that he quit smoking about 33 years ago. He has a 12.00 pack-year smoking history. He has quit using smokeless tobacco.      Appropriate preventive services were discussed with this patient, including applicable screening as appropriate for cardiovascular disease, diabetes, osteopenia/osteoporosis, and glaucoma.  As appropriate for age/gender, discussed screening for colorectal cancer, prostate cancer, breast cancer, and cervical cancer. Checklist reviewing preventive services available has been " given to the patient.    Reviewed patients plan of care and provided an AVS. The Basic Care Plan (routine screening as documented in Health Maintenance) for Noah meets the Care Plan requirement. This Care Plan has been established and reviewed with the Patient.    Counseling Resources:  ATP IV Guidelines  Pooled Cohorts Equation Calculator  Breast Cancer Risk Calculator  Breast Cancer: Medication to Reduce Risk  FRAX Risk Assessment  ICSI Preventive Guidelines  Dietary Guidelines for Americans, 2010  USDA's MyPlate  ASA Prophylaxis  Lung CA Screening    Deemila Mono Estrada MD  [facility]    Identified Health Risks:

## 2022-02-23 ENCOUNTER — TELEPHONE (OUTPATIENT)
Dept: GASTROENTEROLOGY | Facility: CLINIC | Age: 72
End: 2022-02-23
Payer: COMMERCIAL

## 2022-02-23 NOTE — TELEPHONE ENCOUNTER
Screening Questions  BlueKIND OF PREP RedLOCATION [review exclusion criteria] GreenSEDATION TYPE  1. Are you active on mychart? Y    2. What insurance is in the chart? [employer] ADV[employer]AT[employer]      3.  Ordering/Referring Provider: Herberth Estrada MD    4. BMI 23.2 [BMI OVER 40-EXTENDED PREP]  If greater than 40 review exclusion criteria [PAC APPT IF @ [facility]]    5.  Respiratory Screening :  [If yes to any of the following HOSPITAL setting only]     Do you use daily home oxygen? N    Do you have mod to severe Obstructive Sleep Apnea? N  [OKAY @ [facility] SH PH RI]   Do you have Pulmonary Hypertension? N     Do you have UNCONTROLLED asthma? N      6. Have you had a heart or lung transplant? N      7. Are you currently on dialysis? N [ If yes, G-PREP & HOSPITAL setting only]     8. Do you have chronic kidney disease? N [ If yes, G-PREP ]    9. Have you had a stroke or Transient ischemic attack (TIA) within 6 months? N   (If yes, do not schedule at [facility])    10. In the past 6 months, have you had any heart related issues including cardiomyopathy or heart attack? N        If yes, did it require cardiac stenting or other implantable device? N      11. Do you have any implantable devices in your body (pacemaker, defib, LVAD)? N (If yes, schedule at [facility])    12. Do you take nitroglycerin? N   If yes, how often? N  (if yes, HOSPITAL setting ONLY)    13. Are you currently taking any blood thinners? N   [IF YES, INFORM PATIENT TO FOLLOW UP W/ ORDERING PROVIDER FOR BRIDGING INSTRUCTIONS]     14. Are you a diabetic? N   [ If yes, G-PREP ]    15. [FEMALES] Are you currently pregnant? NA    If yes, how many weeks? NA    16. Are you taking any prescription pain medications on a routine schedule?  N  [ If yes, EXTENDED PREP.]    17. Do you have any chemical dependencies such as alcohol, street drugs, or methadone?  N     18. Do you have any history of post-traumatic stress syndrome, severe anxiety or history of psychosis?  N      19. Do you  transfer independently?  Y    20.  Do you have any issues with constipation?  N  [ If yes, EXTENDED PREP.]    21. Preferred LOCAL Pharmacy for Pre Prescription  [id number] - [place], [place] - [address]    Scheduling Details      Caller : [facility]   (Please ask for phone number if not scheduled by patient)    Type of Procedure Scheduled: COLON  Which Colonoscopy Prep was Sent?: SPLIT M   CAIT CF PATIENTS & GROEN'S PATIENTS NEEDS EXTENDED PREP  Surgeon: JOHNATHAN   Date of Procedure: 4/7/2022  Location: [facility]      Sedation Type: CS - changed to NONE per pt request and direction from endo nurse    Conscious Sedation- Needs  for 6 hours after the procedure  MAC/General-Needs  for 24 hours after procedure    Pre-op Required at [facility], [place], [place] and OR for MAC sedation: N  (advise patient they will need a pre-op prior to procedure -)      Informed patient they will need an adult  Y  Cannot take any type of public or medical transportation alone    Pre-Procedure Covid test to be completed at [facility] or Externally: Y    Confirmed Nurse will call to complete assessment Y    Additional comments:

## 2022-02-24 ENCOUNTER — HOSPITAL ENCOUNTER (OUTPATIENT)
Dept: ULTRASOUND IMAGING | Facility: CLINIC | Age: 72
Discharge: HOME | End: 2022-02-24
Attending: FAMILY MEDICINE | Admitting: FAMILY MEDICINE
Payer: COMMERCIAL

## 2022-02-24 DIAGNOSIS — Z13.6 ENCOUNTER FOR ABDOMINAL AORTIC ANEURYSM (AAA) SCREENING: ICD-10-CM

## 2022-02-24 PROCEDURE — 76775 US EXAM ABDO BACK WALL LIM: CPT

## 2022-02-24 PROCEDURE — 76775 US EXAM ABDO BACK WALL LIM: CPT | Mod: 26 | Performed by: RADIOLOGY

## 2022-02-25 DIAGNOSIS — Z11.59 ENCOUNTER FOR SCREENING FOR OTHER VIRAL DISEASES: Primary | ICD-10-CM

## 2022-04-01 ENCOUNTER — TELEPHONE (OUTPATIENT)
Dept: GASTROENTEROLOGY | Facility: CLINIC | Age: 72
End: 2022-04-01

## 2022-04-01 NOTE — TELEPHONE ENCOUNTER
Attempted to contact patient for pre assessment questions. No answer.     Left message to return call to [phone] #2    Aisha Lizama RN

## 2022-04-01 NOTE — TELEPHONE ENCOUNTER
Patient scheduled for colonoscopy on 4/7/22.     Covid test scheduled: 4/3/22    Arrival time: 0725    Facility location: [facility]    Sedation type: CS     Indication for procedure: screening    Anticoagulations? no     Bowel prep recommendation: Miralax/Magnesium citrate/Dulcolax     Pre visit planning completed.    Aisha Lizama RN

## 2022-04-01 NOTE — TELEPHONE ENCOUNTER
"Patient returned call.     Pre assessment questions completed for upcoming colonoscopy procedure scheduled on 4/7/22    COVID test scheduled 4/3/22    Reviewed procedural arrival time 0725 and facility location [facility].    Designated  policy reviewed. Instructed to have someone stay 6 hours post procedure. Patient would rather have no sedation. States they had no sedation in the past. Will send to endoscopy scheduling to facilitate sedation change to \"none\" and FYI to provider and [facility] RN.     Reviewed Miralax  prep instructions with patient. No fiber/iron supplements or foods that contain nuts/seeds 7 days prior to procedure.     Patient verbalized understanding and had no questions or concerns at this time.    Aisha Lizama, RN      "

## 2022-04-04 ENCOUNTER — LAB (OUTPATIENT)
Dept: LAB | Facility: CLINIC | Age: 72
End: 2022-04-04
Attending: STUDENT IN AN ORGANIZED HEALTH CARE EDUCATION/TRAINING PROGRAM
Payer: COMMERCIAL

## 2022-04-04 DIAGNOSIS — Z11.59 ENCOUNTER FOR SCREENING FOR OTHER VIRAL DISEASES: ICD-10-CM

## 2022-04-04 PROCEDURE — U0005 INFEC AGEN DETEC AMPLI PROBE: HCPCS

## 2022-04-04 PROCEDURE — U0003 INFECTIOUS AGENT DETECTION BY NUCLEIC ACID (DNA OR RNA); SEVERE ACUTE RESPIRATORY SYNDROME CORONAVIRUS 2 (SARS-COV-2) (CORONAVIRUS DISEASE [COVID-19]), AMPLIFIED PROBE TECHNIQUE, MAKING USE OF HIGH THROUGHPUT TECHNOLOGIES AS DESCRIBED BY CMS-2020-01-R: HCPCS

## 2022-04-05 LAB — SARS-COV-2 RNA RESP QL NAA+PROBE: NEGATIVE

## 2022-04-07 ENCOUNTER — HOSPITAL ENCOUNTER (OUTPATIENT)
Facility: AMBULATORY SURGERY CENTER | Age: 72
Discharge: HOME | End: 2022-04-07
Attending: STUDENT IN AN ORGANIZED HEALTH CARE EDUCATION/TRAINING PROGRAM | Admitting: STUDENT IN AN ORGANIZED HEALTH CARE EDUCATION/TRAINING PROGRAM
Payer: COMMERCIAL

## 2022-04-07 VITALS
DIASTOLIC BLOOD PRESSURE: 54 MMHG | RESPIRATION RATE: 16 BRPM | HEIGHT: 73 IN | OXYGEN SATURATION: 100 % | SYSTOLIC BLOOD PRESSURE: 121 MMHG | BODY MASS INDEX: 22.53 KG/M2 | TEMPERATURE: 97.2 F | WEIGHT: 170 LBS | HEART RATE: 61 BPM

## 2022-04-07 DIAGNOSIS — D12.5 ADENOMATOUS POLYP OF SIGMOID COLON: Primary | ICD-10-CM

## 2022-04-07 LAB — COLONOSCOPY: NORMAL

## 2022-04-07 PROCEDURE — 45385 COLONOSCOPY W/LESION REMOVAL: CPT | Mod: PT

## 2022-04-07 PROCEDURE — 88305 TISSUE EXAM BY PATHOLOGIST: CPT | Mod: TC | Performed by: STUDENT IN AN ORGANIZED HEALTH CARE EDUCATION/TRAINING PROGRAM

## 2022-04-07 PROCEDURE — 45380 COLONOSCOPY AND BIOPSY: CPT | Mod: 59,PT

## 2022-04-07 RX ORDER — ONDANSETRON 2 MG/ML
4 INJECTION INTRAMUSCULAR; INTRAVENOUS EVERY 30 MIN PRN
Status: DISCONTINUED | OUTPATIENT
Start: 2022-04-07 | End: 2022-04-08 | Stop reason: HOSPADM

## 2022-04-07 RX ORDER — PROCHLORPERAZINE MALEATE 5 MG
5 TABLET ORAL EVERY 6 HOURS PRN
Status: CANCELLED | OUTPATIENT
Start: 2022-04-07

## 2022-04-07 RX ORDER — SODIUM CHLORIDE, SODIUM LACTATE, POTASSIUM CHLORIDE, CALCIUM CHLORIDE 600; 310; 30; 20 MG/100ML; MG/100ML; MG/100ML; MG/100ML
INJECTION, SOLUTION INTRAVENOUS CONTINUOUS
Status: DISCONTINUED | OUTPATIENT
Start: 2022-04-07 | End: 2022-04-08 | Stop reason: HOSPADM

## 2022-04-07 RX ORDER — FLUMAZENIL 0.1 MG/ML
0.2 INJECTION, SOLUTION INTRAVENOUS
Status: CANCELLED | OUTPATIENT
Start: 2022-04-07 | End: 2022-04-07

## 2022-04-07 RX ORDER — ONDANSETRON 2 MG/ML
4 INJECTION INTRAMUSCULAR; INTRAVENOUS
Status: DISCONTINUED | OUTPATIENT
Start: 2022-04-07 | End: 2022-04-08 | Stop reason: HOSPADM

## 2022-04-07 RX ORDER — FENTANYL CITRATE 50 UG/ML
25 INJECTION, SOLUTION INTRAMUSCULAR; INTRAVENOUS
Status: DISCONTINUED | OUTPATIENT
Start: 2022-04-07 | End: 2022-04-08 | Stop reason: HOSPADM

## 2022-04-07 RX ORDER — NALOXONE HYDROCHLORIDE 0.4 MG/ML
0.4 INJECTION, SOLUTION INTRAMUSCULAR; INTRAVENOUS; SUBCUTANEOUS
Status: CANCELLED | OUTPATIENT
Start: 2022-04-07

## 2022-04-07 RX ORDER — HYDROMORPHONE HYDROCHLORIDE 1 MG/ML
0.2 INJECTION, SOLUTION INTRAMUSCULAR; INTRAVENOUS; SUBCUTANEOUS EVERY 5 MIN PRN
Status: DISCONTINUED | OUTPATIENT
Start: 2022-04-07 | End: 2022-04-08 | Stop reason: HOSPADM

## 2022-04-07 RX ORDER — ONDANSETRON 4 MG/1
4 TABLET, ORALLY DISINTEGRATING ORAL EVERY 6 HOURS PRN
Status: CANCELLED | OUTPATIENT
Start: 2022-04-07

## 2022-04-07 RX ORDER — LIDOCAINE 40 MG/G
CREAM TOPICAL
Status: DISCONTINUED | OUTPATIENT
Start: 2022-04-07 | End: 2022-04-08 | Stop reason: HOSPADM

## 2022-04-07 RX ORDER — GABAPENTIN 100 MG/1
100 CAPSULE ORAL
Status: DISCONTINUED | OUTPATIENT
Start: 2022-04-07 | End: 2022-04-08 | Stop reason: HOSPADM

## 2022-04-07 RX ORDER — ONDANSETRON 2 MG/ML
4 INJECTION INTRAMUSCULAR; INTRAVENOUS EVERY 6 HOURS PRN
Status: CANCELLED | OUTPATIENT
Start: 2022-04-07

## 2022-04-07 RX ORDER — MEPERIDINE HYDROCHLORIDE 25 MG/ML
12.5 INJECTION INTRAMUSCULAR; INTRAVENOUS; SUBCUTANEOUS
Status: DISCONTINUED | OUTPATIENT
Start: 2022-04-07 | End: 2022-04-08 | Stop reason: HOSPADM

## 2022-04-07 RX ORDER — ONDANSETRON 4 MG/1
4 TABLET, ORALLY DISINTEGRATING ORAL EVERY 30 MIN PRN
Status: DISCONTINUED | OUTPATIENT
Start: 2022-04-07 | End: 2022-04-08 | Stop reason: HOSPADM

## 2022-04-07 RX ORDER — FENTANYL CITRATE 50 UG/ML
25 INJECTION, SOLUTION INTRAMUSCULAR; INTRAVENOUS EVERY 5 MIN PRN
Status: DISCONTINUED | OUTPATIENT
Start: 2022-04-07 | End: 2022-04-08 | Stop reason: HOSPADM

## 2022-04-07 RX ORDER — ACETAMINOPHEN 325 MG/1
975 TABLET ORAL ONCE
Status: DISCONTINUED | OUTPATIENT
Start: 2022-04-07 | End: 2022-04-08 | Stop reason: HOSPADM

## 2022-04-07 RX ORDER — OXYCODONE HYDROCHLORIDE 5 MG/1
5 TABLET ORAL EVERY 4 HOURS PRN
Status: DISCONTINUED | OUTPATIENT
Start: 2022-04-07 | End: 2022-04-08 | Stop reason: HOSPADM

## 2022-04-07 RX ORDER — NALOXONE HYDROCHLORIDE 0.4 MG/ML
0.2 INJECTION, SOLUTION INTRAMUSCULAR; INTRAVENOUS; SUBCUTANEOUS
Status: CANCELLED | OUTPATIENT
Start: 2022-04-07

## 2022-04-07 NOTE — LETTER
April 11, 2022      Noah BURGER Russell  [address]  [facility] [address]        Dear ,    We are writing to inform you of your test results.    The polyp I biopsied at the opening of your appendix returned as an adenoma, which is a precancerous polyp. I was not able to fully remove this polyp due to where it was located, and I am recommending a repeat colonoscopy with one of our providers who removes complex polyps to fully remove this polyp. Someone from his office will call you to set up this procedure.     One of the other polyps I removed also returned as an adenoma. Once your repeat colonoscopy is done, we will be able to tell you when you are due for your follow up colonoscopy.     It has been a pleasure to participate in your care.  Please call our clinic if you have any questions or concerns.          Resulted Orders   Surgical Pathology Exam   Result Value Ref Range    Case Report       Surgical Pathology Report                         Case: [id number]                                  Authorizing Provider:  Reema Gregg MD          Collected:           04/07/2022 08:44 AM          Ordering Location:     [facility] OR  Received:            04/07/2022 09:22 AM                                 [place]                                                                  Pathologist:           Lucius Casas DO                                                            Specimens:   A) - Appendiceal orifice, Appendiceal orifice polyp                                                 B) - Large Intestine, Colon, Ascending, Ascending colon polyp                                       C) - Rectum, Rectal colon polyp x2                                                         Final Diagnosis       A(1). COLON, APPENDICEAL ORIFICE, POLYP:  - Sessile serrated adenoma  - No cytologic dysplasia identified    B(2).  COLON, ASCENDING, POLYP:  - Sessile serrated adenoma  - No cytologic dysplasia  "identified    C(3). RECTUM, POLYPS:  - Hyperplastic polyp  - Separate fragment of unremarkable rectal mucosa        Clinical Information       Colon polyps      Gross Description       A(1). Appendiceal orifice, Appendiceal orifice polyp:  The specimen is received in formalin with proper patient identification, labeled \"appendiceal orifice polyp\".  The specimen consists of 2 irregular tan pieces of soft tissue, 0.1 cm and 0.2 cm in greatest dimension which are entirely submitted in cassette A1.    B(2). Large Intestine, Colon, Ascending, Ascending colon polyp:  The specimen is received in formalin with proper patient identification, labeled \"ascending colon polyp\".  The specimen consists of a 2.2 x 2.0 x 0.1 cm flattened portion of tan mucosa with focal petechial hemorrhage.  The resection margin is inked blue.  The specimen is serially sectioned and entirely submitted in cassettes B1-B2     C(3). Rectum, Rectal colon polyp x2:  The specimen is received in formalin with proper patient identification, labeled \"rectal colon polyp x2\".  The specimen consists of a 0.3 cm in greatest dimension, irregular tan soft tissue which is entirely submitted in cassette C1.  Filtration of the remaining formalin reveals no additional tissue.        Microscopic Description       Microscopic examination was performed.        Performing Labs       The technical component of this testing was completed at [facility]      Case Images         If you have any questions or concerns, please call the clinic at the number listed above.       Sincerely,      Reema Gregg MD          "

## 2022-04-07 NOTE — H&P
Noah BURGER Wells  [id number]  male  71 year old      Reason for procedure/surgery: colon for surveillance    Patient Active Problem List   Diagnosis     Adenomatous polyp of sigmoid colon       Past Surgical History:    Past Surgical History:   Procedure Laterality Date     COLONOSCOPY  10/11/13    2 polyps, rpt 5 yrs, Dr. Muhammad     ORTHOPEDIC SURGERY  age 23    feet       Past Medical History:   Past Medical History:   Diagnosis Date     Allergic rhinitis      Heel fracture     fall from a tree - bilateral heel fractures requiring surgery       Social History:   Social History     Tobacco Use     Smoking status: Former Smoker     Packs/day: 1.00     Years: 12.00     Pack years: 12.00     Quit date: 1988     Years since quittin.6     Smokeless tobacco: Former User   Substance Use Topics     Alcohol use: Yes     Comment: occ       Family History:   Family History   Problem Relation Age of Onset     Cancer Mother         uterine     Respiratory Father         TB as a young man - treated     Cancer - colorectal Paternal Uncle 60     Coronary Artery Disease Brother 65     Gastrointestinal Disease Brother         diverticulitis       Allergies:   Allergies   Allergen Reactions     Penicillins Swelling       Active Medications:   Current Outpatient Medications   Medication Sig Dispense Refill     Ascorbic Acid (VITAMIN C) 500 MG CAPS        B Complex Vitamins (B COMPLEX PO)        FISH OIL        multivitamin, therapeutic with minerals (MULTI-VITAMIN) TABS tablet Take 1 tablet by mouth daily       Efinaconazole (JUBLIA) 10 % SOLN Externally apply topically daily 8 mL 1       Systemic Review:   CONSTITUTIONAL: NEGATIVE for fever, chills, change in weight  ENT/MOUTH: NEGATIVE for ear, mouth and throat problems  RESP: NEGATIVE for significant cough or SOB  CV: NEGATIVE for chest pain, palpitations or peripheral edema    Physical Examination:   Vital Signs: BP (!) 142/77   Pulse 61   Temp 97.8  F (36.6  C)  "(Temporal)   Resp 16   Ht 1.854 m (6' 1\")   Wt 77.1 kg (170 lb)   SpO2 98%   BMI 22.43 kg/m    GENERAL: healthy, alert and no distress  NECK: no adenopathy, no asymmetry, masses, or scars  RESP: lungs clear to auscultation - no rales, rhonchi or wheezes  CV: regular rate and rhythm, normal S1 S2, no S3 or S4, no murmur, click or rub, no peripheral edema and peripheral pulses strong  ABDOMEN: soft, nontender, no hepatosplenomegaly, no masses and bowel sounds normal  MS: no gross musculoskeletal defects noted, no edema    Plan: Appropriate to proceed as scheduled.      Reema Gregg MD  4/7/2022    PCP:  Kassie Ríos    "

## 2022-04-08 LAB
PATH REPORT.COMMENTS IMP SPEC: NORMAL
PATH REPORT.COMMENTS IMP SPEC: NORMAL
PATH REPORT.FINAL DX SPEC: NORMAL
PATH REPORT.GROSS SPEC: NORMAL
PATH REPORT.MICROSCOPIC SPEC OTHER STN: NORMAL
PATH REPORT.RELEVANT HX SPEC: NORMAL
PHOTO IMAGE: NORMAL

## 2022-04-08 PROCEDURE — 88305 TISSUE EXAM BY PATHOLOGIST: CPT | Mod: 26 | Performed by: PATHOLOGY

## 2022-04-10 ENCOUNTER — HEALTH MAINTENANCE LETTER (OUTPATIENT)
Age: 72
End: 2022-04-10

## 2022-04-11 ENCOUNTER — PATIENT OUTREACH (OUTPATIENT)
Dept: GASTROENTEROLOGY | Facility: CLINIC | Age: 72
End: 2022-04-11
Payer: COMMERCIAL

## 2022-04-11 NOTE — LETTER
[facility] CARE COORDINATION  Advanced Endoscopy  April 29, 2022      Noah Wells  [address]  [facility] [address]      Dear Noah,    I have attempted to reach you by phone but have not been successful. Dr Gregg, who performed your recent colonoscopy, asked an Advanced Endoscopy provider to repeat the procedure.     We would appreciate if you would give us a call to let us know if you would like to schedule this. We would like to provide any additional support you may need on achieving your health care related goals. I know that there are many things that can affect our ability to communicate and I hope we can provide you the care needed.    All of us at the [facility] are invested in your health and are here to assist you in meeting your goals.     Sincerely,    Kassie Shaikh, RN, BSN,   Advanced Gastroenterology  Care coordinator  Ph [phone]

## 2022-04-11 NOTE — CONFIDENTIAL NOTE
Called pt to discuss procedure recommended by Dr Gregg. Wife states that pt is sleeping but will consider dates in June for this procedure. Offered [facility] dates of June 10th and 24th    Procedure/Imaging/Clinic: Colonoscopy with EMR   Physician: To   Timing: next available   Procedure length: 60 min   Anesthesia: None   Dx: appendiceal polyp   Tier: 2   Location: [facility] or [facility] OR    Will wait for call back to order procedure     Kassie Shaikh RN, BSN,   Advanced Gastroenterology  Care coordinator

## 2022-06-09 ENCOUNTER — OFFICE VISIT (OUTPATIENT)
Dept: DERMATOLOGY | Facility: CLINIC | Age: 72
End: 2022-06-09
Attending: FAMILY MEDICINE
Payer: COMMERCIAL

## 2022-06-09 DIAGNOSIS — D48.9 NEOPLASM OF UNCERTAIN BEHAVIOR: ICD-10-CM

## 2022-06-09 DIAGNOSIS — D18.01 CHERRY ANGIOMA: ICD-10-CM

## 2022-06-09 DIAGNOSIS — L81.4 LENTIGINES: ICD-10-CM

## 2022-06-09 DIAGNOSIS — D22.9 MULTIPLE BENIGN NEVI: Primary | ICD-10-CM

## 2022-06-09 DIAGNOSIS — L57.0 ACTINIC KERATOSES: ICD-10-CM

## 2022-06-09 DIAGNOSIS — L82.1 SEBORRHEIC KERATOSIS: ICD-10-CM

## 2022-06-09 PROCEDURE — 88305 TISSUE EXAM BY PATHOLOGIST: CPT | Mod: 26 | Performed by: DERMATOLOGY

## 2022-06-09 PROCEDURE — 88305 TISSUE EXAM BY PATHOLOGIST: CPT | Mod: TC | Performed by: DERMATOLOGY

## 2022-06-09 PROCEDURE — 17003 DESTRUCT PREMALG LES 2-14: CPT | Performed by: DERMATOLOGY

## 2022-06-09 PROCEDURE — 17000 DESTRUCT PREMALG LESION: CPT | Mod: XS | Performed by: DERMATOLOGY

## 2022-06-09 PROCEDURE — 99203 OFFICE O/P NEW LOW 30 MIN: CPT | Mod: 25 | Performed by: DERMATOLOGY

## 2022-06-09 PROCEDURE — 11102 TANGNTL BX SKIN SINGLE LES: CPT | Performed by: DERMATOLOGY

## 2022-06-09 ASSESSMENT — PAIN SCALES - GENERAL: PAINLEVEL: NO PAIN (0)

## 2022-06-09 NOTE — PATIENT INSTRUCTIONS
Patient Education     Checking for Skin Cancer  You can find cancer early by checking your skin each month. There are 3 kinds of skin cancer. They are melanoma, basal cell carcinoma, and squamous cell carcinoma. Doing monthly skin checks is the best way to find new marks or skin changes. Follow the instructions below for checking your skin.   The ABCDEs of checking moles for melanoma   Check your moles or growths for signs of melanoma using ABCDE:   Asymmetry: the sides of the mole or growth don t match  Border: the edges are ragged, notched, or blurred  Color: the color within the mole or growth varies  Diameter: the mole or growth is larger than 6 mm (size of a pencil eraser)  Evolving: the size, shape, or color of the mole or growth is changing (evolving is not shown in the images below)    Checking for other types of skin cancer  Basal cell carcinoma or squamous cell carcinoma have symptoms such as:     A spot or mole that looks different from all other marks on your skin  Changes in how an area feels, such as itching, tenderness, or pain  Changes in the skin's surface, such as oozing, bleeding, or scaliness  A sore that does not heal  New swelling or redness beyond the border of a mole    Who s at risk?  Anyone can get skin cancer. But you are at greater risk if you have:   Fair skin, light-colored hair, or light-colored eyes  Many moles or abnormal moles on your skin  A history of sunburns from sunlight or tanning beds  A family history of skin cancer  A history of exposure to radiation or chemicals  A weakened immune system  If you have had skin cancer in the past, you are at risk for recurring skin cancer.   How to check your skin  Do your monthly skin checkups in front of a full-length mirror. Check all parts of your body, including your:   Head (ears, face, neck, and scalp)  Torso (front, back, and sides)  Arms (tops, undersides, upper, and lower armpits)  Hands (palms, backs, and fingers, including  under the nails)  Buttocks and genitals  Legs (front, back, and sides)  Feet (tops, soles, toes, including under the nails, and between toes)  If you have a lot of moles, take digital photos of them each month. Make sure to take photos both up close and from a distance. These can help you see if any moles change over time.   Most skin changes are not cancer. But if you see any changes in your skin, call your doctor right away. Only he or she can diagnose a problem. If you have skin cancer, seeing your doctor can be the first step toward getting the treatment that could save your life.   [employer] last reviewed this educational content on 4/1/2019 2000-2020 [employer] [address] [place] [address], [place] [address]. All rights reserved. This information is not intended as a substitute for professional medical care. Always follow your healthcare professional's instructions.       When should I call my doctor?  If you are worsening or not improving, please, contact us or seek urgent care as noted below.     Who should I call with questions (adults)?  [facility] (adult and pediatric): [phone]  [facility] (adult): [phone]  For urgent needs outside of business hours call the [facility] at [phone] and ask for the dermatology resident on call to be paged  If this is a medical emergency and you are unable to reach an ER, Call [phone]    Who should I call with questions (pediatric)?  [facility]- Pediatric Dermatology  Dr. Elizabet Dela Cruz, Dr. Eri Smith, Dr. Izabel Bertrand, CLARISSA Ring, Dr. Samantha Lopez, Dr. Dana Drummond & Dr. Niels Hernandez  Non-urgent nurse triage line; [phone]- Yolanda and Keely RICKETTS Care Coordinatorpatrick Zarco (/Complex ) [phone]    If you need a prescription refill, please contact your pharmacy. Refills are approved or denied by our  Physicians during normal business hours, Monday through Fridays  Per office policy, refills will not be granted if you have not been seen within the past year (or sooner depending on your child's condition)    Scheduling Information:  Pediatric Appointment Scheduling and Call Center [phone]  Radiology Scheduling- [phone]  Sedation Unit Scheduling- [phone]  [place] Scheduling- General [phone]; Pediatric Dermatology [phone]  Main  Services: [phone]  Turkmen: [phone]  Kenyan: [phone]  Hmong/Zimbabwean/Wolof: [phone]  Preadmission Nursing Department Fax Number: [phone] (Fax all pre-operative paperwork to this number)    For urgent matters arising during evenings, weekends, or holidays that cannot wait for normal business hours please call [phone] and ask for the dermatology resident on call to be paged. Cryotherapy    What is it?  Use of a very cold liquid, such as liquid nitrogen, to freeze and destroy abnormal skin cells that need to be removed    What should I expect?  Tenderness and redness  A small blister that might grow and fill with dark purple blood. There may be crusting.  More than one treatment may be needed if the lesions do not go away.    How do I care for the treated area?  Gently wash the area with your hands when bathing.  Use a thin layer of Vaseline to help with healing. You may use a Band-Aid.   The area should heal within 7-10 days and may leave behind a pink or lighter color.   Do not use an antibiotic or Neosporin ointment.   You may take acetaminophen (Tylenol) for pain.     Call your doctor if you have:  Severe pain  Signs of infection (warmth, redness, cloudy yellow drainage, and or a bad smell)  Questions or concerns    Who should I call with questions?      [facility]: [phone]      [facility]: [phone]      For urgent needs outside of business hours  call the [facility] at [phone] and ask for the dermatology resident on call Wound Care After a Biopsy    What is a skin biopsy?  A skin biopsy allows the doctor to examine a very small piece of tissue under the microscope to determine the diagnosis and the best treatment for the skin condition. A local anesthetic (numbing medicine)  is injected with a very small needle into the skin area to be tested. A small piece of skin is taken from the area. Sometimes a suture (stitch) is used.     What are the risks of a skin biopsy?  I will experience scar, bleeding, swelling, pain, crusting and redness. I may experience incomplete removal or recurrence. Risks of this procedure are excessive bleeding, bruising, infection, nerve damage, numbness, thick (hypertrophic or keloidal) scar and non-diagnostic biopsy.    How should I care for my wound for the first 24 hours?  Keep the wound dry and covered for 24 hours  If it bleeds, hold direct pressure on the area for 15 minutes. If bleeding does not stop then go to the emergency room  Avoid strenuous exercise the first 1-2 days or as your doctor instructs you    How should I care for the wound after 24 hours?  After 24 hours, remove the bandage  You may bathe or shower as normal  If you had a scalp biopsy, you can shampoo as usual and can use shower water to clean the biopsy site daily  Clean the wound twice a day with gentle soap and water  Do not scrub, be gentle  Apply white petroleum/Vaseline after cleaning the wound with a cotton swab or a clean finger, and keep the site covered with a Bandaid /bandage. Bandages are not necessary with a scalp biopsy  If you are unable to cover the site with a Bandaid /bandage, re-apply ointment 2-3 times a day to keep the site moist. Moisture will help with healing  Avoid strenuous activity for first 1-2 days  Avoid lakes, rivers, pools, and oceans until the stitches are removed or the site is healed    How do I clean my wound?  Wash  hands thoroughly with soap or use hand  before all wound care  Clean the wound with gentle soap and water  Apply white petroleum/Vaseline  to wound after it is clean  Replace the Bandaid /bandage to keep the wound covered for the first few days or as instructed by your doctor  If you had a scalp biopsy, warm shower water to the area on a daily basis should suffice    What should I use to clean my wound?   Cotton-tipped applicators (Qtips )  White petroleum jelly (Vaseline ). Use a clean new container and use Q-tips to apply.  Bandaids   as needed  Gentle soap     How should I care for my wound long term?  Do not get your wound dirty  Keep up with wound care for one week or until the area is healed.  A small scab will form and fall off by itself when the area is completely healed. The area will be red and will become pink in color as it heals. Sun protection is very important for how your scar will turn out. Sunscreen with an SPF 30 or greater is recommended once the area is healed.  You should have some soreness but it should be mild and slowly go away over several days. Talk to your doctor about using tylenol for pain,    When should I call my doctor?  If you have increased:   Pain or swelling  Pus or drainage (clear or slightly yellow drainage is ok)  Temperature over 100F  Spreading redness or warmth around wound    When will I hear about my results?  The biopsy results can take 2 weeks to come back.  Your results will automatically release to [employer] before your provider has even reviewed them.  The clinic will call you with the results, send you a Mydish message, or have you schedule a follow-up clinic or phone time to discuss the results.  Contact our clinics if you do not hear from us in 2 weeks.    Who should I call with questions?  [facility]: [phone]  [facility]: [phone]  For urgent needs outside of business hours call  the [facility] at [phone] and ask for the dermatology resident on call

## 2022-06-09 NOTE — PROGRESS NOTES
[facility] Dermatology Note  Encounter Date: Jun 9, 2022  Office Visit     Dermatology Problem List:  1. Last FBSE 6/9/2022  2. NUB, left upper back  3. AK, right lateral eyebrow and right forehead  ____________________________________________    Assessment & Plan:    # NUB, left upper back, ddx favor melanoma less likely pigmented BCC or pigmented SCC  - Shave biopsy, see procedure below     # AKs, right lateral eyebrow and right forehead  - Cryo therapy today, see procedure below    # Benign lesions: Multiple benign nevi, solar lentigos, seborrheic keratoses, cherry angiomas. Explained to patient benign nature of lesion. No treatment is necessary at this time unless the lesion changes or becomes symptomatic.   - ABCDs of melanoma were discussed and self skin checks were advised.  - Sun precaution was advised including the use of sun screens of SPF 30 or higher, sun protective clothing, and avoidance of tanning beds.    Procedures Performed:   - Shave biopsy procedure note, location(s): see above. After discussion of benefits and risks including but not limited to bleeding, infection, scar, incomplete removal, recurrence, and non-diagnostic biopsy, written consent and photographs were obtained. The area was cleaned with isopropyl alcohol. 0.5mL of 1% lidocaine with epinephrine was injected to obtain adequate anesthesia of lesion(s). Shave biopsy at site(s) performed. Hemostasis was achieved with aluminium chloride. Petrolatum ointment and a sterile dressing were applied. The patient tolerated the procedure and no complications were noted. The patient was provided with verbal and written post care instructions.     - Cryotherapy procedure note, location(s): see above. After verbal consent and discussion of risks and benefits including, but not limited to, dyspigmentation/scar, blister, and pain, 2 lesion(s) was(were) treated with 1-2 mm freeze border for 1-2 cycles with liquid nitrogen. Post  cryotherapy instructions were provided.    Follow-up: pending path results    Staff and Scribe:     Scribe Disclosure:  I, Fay Leonardo, am serving as a scribe to document services personally performed by Cooper Dwyer MD based on data collection and the provider's statements to me.     Provider Disclosure:   The documentation recorded by the scribe accurately reflects the services I personally performed and the decisions made by me.    Cooper Dwyer MD    Department of Dermatology  [facility]: Phone: [phone], Fax:[phone]  [facility]: Phone: [phone] Fax: [phone]    ____________________________________________    CC: Skin Check (Jesus is here for a skin check. He has areas of concerns on his forehead, back, stomach and lower legs.)    HPI:  Mr. Noah Wells is a(n) 72 year old male who presents today as a new patient for a FBSE. Patient reports that a spot on his left upper back. Patient says that he first noticed it a year ago. He says that it has not grown too much since he first noticed. Patient also notes a few spots of concern on his face. The patient otherwise denies any new or concerning lesions. No bleeding, painful, pruritic, or changing lesions. They report no personal history of skin cancer. There is family history of skin cancer (sister). No history of immunosuppression. No history of indoor tanning. They use sunscreen and protective clothing when outdoors for sun protection. No occupational exposure to ultraviolet light or other forms of radiation. Health otherwise stable. No other skin concerns.       Labs Reviewed:  N/A    Physical Exam:  Vitals: There were no vitals taken for this visit.  SKIN: Full skin, which includes the head/face, both arms, chest, back, abdomen,both legs, genitalia and/or groin buttocks, digits and/or nails, was examined.  - 2.0 x  1.2 cm light pink shiny thin plaque with scattered very disorganized globular and reticular pigment  - There are dome shaped bright red papules on the trunk and extremities.   - Multiple regular brown pigmented macules and papules are identified on the trunk and extremities.   - Scattered brown macules on sun exposed areas.  - There are waxy stuck on tan to brown papules on the trunk and extremities.   - There are erythematous macules with overyling adherent scale on the right lateral eyebrow and right forehead.   - No other lesions of concern on areas examined.             Medications:  Current Outpatient Medications   Medication     Ascorbic Acid (VITAMIN C) 500 MG CAPS     B Complex Vitamins (B COMPLEX PO)     FISH OIL     multivitamin w/minerals (THERA-VIT-M) tablet     Efinaconazole (JUBLIA) 10 % SOLN     No current facility-administered medications for this visit.      Past Medical History:   Patient Active Problem List   Diagnosis     Adenomatous polyp of sigmoid colon     Past Medical History:   Diagnosis Date     Allergic rhinitis      Heel fracture     fall from a tree - bilateral heel fractures requiring surgery        CC Herberth Estrada MD  [address] [address] on close of this encounter.

## 2022-06-09 NOTE — NURSING NOTE
Dermatology Rooming Note    Noah Wells's goals for this visit include:   Chief Complaint   Patient presents with     Skin Check     Jesus is here for a skin check. He has areas of concerns on his forehead, back, stomach and lower legs.     Iram Cox, Facilitator

## 2022-06-09 NOTE — LETTER
6/9/2022       RE: Noah Wells  [address]  [facility] [address]     Dear Colleague,    Thank you for referring your patient, Noah Wells, to the [facility] DERMATOLOGY CLINIC [place] at [facility]. Please see a copy of my visit note below.    [facility] Dermatology Note  Encounter Date: Jun 9, 2022  Office Visit     Dermatology Problem List:  1. Last FBSE 6/9/2022  2. NUB, left upper back  3. AK, right lateral eyebrow and right forehead  ____________________________________________    Assessment & Plan:    # NUB, left upper back, ddx favor melanoma less likely pigmented BCC or pigmented SCC  - Shave biopsy, see procedure below     # AKs, right lateral eyebrow and right forehead  - Cryo therapy today, see procedure below    # Benign lesions: Multiple benign nevi, solar lentigos, seborrheic keratoses, cherry angiomas. Explained to patient benign nature of lesion. No treatment is necessary at this time unless the lesion changes or becomes symptomatic.   - ABCDs of melanoma were discussed and self skin checks were advised.  - Sun precaution was advised including the use of sun screens of SPF 30 or higher, sun protective clothing, and avoidance of tanning beds.    Procedures Performed:   - Shave biopsy procedure note, location(s): see above. After discussion of benefits and risks including but not limited to bleeding, infection, scar, incomplete removal, recurrence, and non-diagnostic biopsy, written consent and photographs were obtained. The area was cleaned with isopropyl alcohol. 0.5mL of 1% lidocaine with epinephrine was injected to obtain adequate anesthesia of lesion(s). Shave biopsy at site(s) performed. Hemostasis was achieved with aluminium chloride. Petrolatum ointment and a sterile dressing were applied. The patient tolerated the procedure and no complications were noted. The patient was provided with verbal and  written post care instructions.     - Cryotherapy procedure note, location(s): see above. After verbal consent and discussion of risks and benefits including, but not limited to, dyspigmentation/scar, blister, and pain, 2 lesion(s) was(were) treated with 1-2 mm freeze border for 1-2 cycles with liquid nitrogen. Post cryotherapy instructions were provided.    Follow-up: pending path results    Staff and Scribe:     Scribe Disclosure:  I, Fay Patterson, am serving as a scribe to document services personally performed by Cooper Dwyer MD based on data collection and the provider's statements to me.     Provider Disclosure:   The documentation recorded by the scribe accurately reflects the services I personally performed and the decisions made by me.    Cooper Dwyer MD    Department of Dermatology  [facility]: Phone: [phone], Fax:[phone]  [facility]: Phone: [phone] Fax: [phone]    ____________________________________________    CC: Skin Check (Jesus is here for a skin check. He has areas of concerns on his forehead, back, stomach and lower legs.)    HPI:  Mr. Noah Wells is a(n) 72 year old male who presents today as a new patient for a FBSE. Patient reports that a spot on his left upper back. Patient says that he first noticed it a year ago. He says that it has not grown too much since he first noticed. Patient also notes a few spots of concern on his face. The patient otherwise denies any new or concerning lesions. No bleeding, painful, pruritic, or changing lesions. They report no personal history of skin cancer. There is family history of skin cancer (sister). No history of immunosuppression. No history of indoor tanning. They use sunscreen and protective clothing when outdoors for sun protection. No occupational exposure to ultraviolet light or other forms of  radiation. Health otherwise stable. No other skin concerns.       Labs Reviewed:  N/A    Physical Exam:  Vitals: There were no vitals taken for this visit.  SKIN: Full skin, which includes the head/face, both arms, chest, back, abdomen,both legs, genitalia and/or groin buttocks, digits and/or nails, was examined.  - 2.0 x 1.2 cm light pink shiny thin plaque with scattered very disorganized globular and reticular pigment  - There are dome shaped bright red papules on the trunk and extremities.   - Multiple regular brown pigmented macules and papules are identified on the trunk and extremities.   - Scattered brown macules on sun exposed areas.  - There are waxy stuck on tan to brown papules on the trunk and extremities.   - There are erythematous macules with overyling adherent scale on the right lateral eyebrow and right forehead.   - No other lesions of concern on areas examined.             Medications:  Current Outpatient Medications   Medication     Ascorbic Acid (VITAMIN C) 500 MG CAPS     B Complex Vitamins (B COMPLEX PO)     FISH OIL     multivitamin w/minerals (THERA-VIT-M) tablet     Efinaconazole (JUBLIA) 10 % SOLN     No current facility-administered medications for this visit.      Past Medical History:   Patient Active Problem List   Diagnosis     Adenomatous polyp of sigmoid colon     Past Medical History:   Diagnosis Date     Allergic rhinitis      Heel fracture     fall from a tree - bilateral heel fractures requiring surgery        AWA Estrada MD  [facility] [address] [address] on close of this encounter.

## 2022-06-09 NOTE — NURSING NOTE
Lidocaine-epinephrine 1-1:012521 % injection   2mL once for one use, starting 6/9/2022 ending 6/9/2022,  2mL disp, R-0, injection  Injected by Dr. Dwyer

## 2022-06-10 LAB
PATH REPORT.COMMENTS IMP SPEC: ABNORMAL
PATH REPORT.COMMENTS IMP SPEC: YES
PATH REPORT.FINAL DX SPEC: ABNORMAL
PATH REPORT.GROSS SPEC: ABNORMAL
PATH REPORT.MICROSCOPIC SPEC OTHER STN: ABNORMAL
PATH REPORT.RELEVANT HX SPEC: ABNORMAL

## 2022-06-13 NOTE — RESULT ENCOUNTER NOTE
Attempted to call patient again. Left voicemail message. Will try again tomorrow morning.     Cooper Dwyer MD  Pronouns: he/him/his    Department of Dermatology  [facility]: Phone: [phone], Fax:[phone]  [facility]: Phone: [phone] Fax: [phone]

## 2022-06-13 NOTE — RESULT ENCOUNTER NOTE
T1a melanoma, superficial spreading type. Needs WLE.   Attempted to call patient and left voicemail x 2. Will try again later.   [employer] message sent with results.   Once patient has either been contacted by myself, or read [employer] message. Please place derm surg referral and schedule for wide local excision.   Also follow-up with me in 3 months for another skin check.     Thanks!    Cooper Dwyer MD  Pronouns: he/him/his    Department of Dermatology  [facility]: Phone: [phone], Fax:[phone]  [facility]: Phone: [phone] Fax: [phone]

## 2022-06-14 ENCOUNTER — TELEPHONE (OUTPATIENT)
Dept: DERMATOLOGY | Facility: CLINIC | Age: 72
End: 2022-06-14
Payer: COMMERCIAL

## 2022-06-14 DIAGNOSIS — C43.9 MELANOMA (H): Primary | ICD-10-CM

## 2022-06-14 NOTE — TELEPHONE ENCOUNTER
----- Message from Cooper Dwyer MD sent at 6/13/2022 12:37 PM CDT -----  T1a melanoma, superficial spreading type. Needs WLE.   Attempted to call patient and left voicemail x 2. Will try again later.   [employer] message sent with results.   Once patient has either been contacted by myself, or read [email or web address] message. Please place derm surg referral and schedule for wide local excision.   Also follow-up with me in 3 months for another skin check.     Thanks!    Cooper Dwyer MD  Pronouns: he/him/his    Department of Dermatology  [facility]: Phone: [phone], Fax:[phone]  [facility]: Phone: [phone] Fax: [phone]

## 2022-06-15 NOTE — TELEPHONE ENCOUNTER
FUTURE VISIT INFORMATION      FUTURE VISIT INFORMATION:    Date: 6.22.22    Time: 1:15    Location: Telephone  REFERRAL INFORMATION:    Referring provider:  Reymundo    Referring providers clinic:  Derm    Reason for visit/diagnosis  L upper back, T1a melanoma superficial/spreading type, WLE    RECORDS REQUESTED FROM:       Clinic name Comments Records Status Imaging Status   Derm 6.9.22  Path # [id number] [facility] Epic

## 2022-06-22 ENCOUNTER — PRE VISIT (OUTPATIENT)
Dept: DERMATOLOGY | Facility: CLINIC | Age: 72
End: 2022-06-22
Payer: COMMERCIAL

## 2022-06-22 ENCOUNTER — VIRTUAL VISIT (OUTPATIENT)
Dept: DERMATOLOGY | Facility: CLINIC | Age: 72
End: 2022-06-22
Payer: COMMERCIAL

## 2022-06-22 DIAGNOSIS — C43.9 MALIGNANT MELANOMA, UNSPECIFIED SITE (H): Primary | ICD-10-CM

## 2022-06-22 PROCEDURE — 99213 OFFICE O/P EST LOW 20 MIN: CPT | Mod: 95 | Performed by: DERMATOLOGY

## 2022-06-22 ASSESSMENT — PAIN SCALES - GENERAL: PAINLEVEL: NO PAIN (0)

## 2022-06-22 NOTE — LETTER
6/22/2022       RE: Noah Wells  [address]  [facility] [address]     Dear Colleague,    Thank you for referring your patient, Noah Wells, to the [facility] DERMATOLOGIC SURGERY CLINIC [place] at [facility]. Please see a copy of my visit note below.    Mohs Micrographic Surgery Consult Note    Jun 22, 2022  Start time (if telephone encounter): 1:15 p.m.  End time (if telephone encounter): 1:25 p.m.    Dermatology Problem List:  1. Last FBSE 6/9/2022  2. Malignant melanoma, left upper back  3. AK, right lateral eyebrow and right forehead    Subjective: The patient is a 72 year old man who presents today for Mohs micrographic surgery consultation for a recent diagnosis of skin cancer.    Skin cancer(s): Melanoma  Location(s): Left upper back  Associated symptoms: pruritus, tenderness to touch  Onset: within last 1 year    No other associated symptoms, modifying factors, or prior treatments, except when noted above. The patient denies any constitutional symptoms, lymphadenopathy, unintentional weight loss or decreased appetite. No other skin concerns today.    Objective:   No photos submitted.    Assessment and Plan:     1. Plan for Mohs micrographic surgery for skin cancer(s) above:  - We discussed the nature of the diagnosis/condition above. We discussed the treatment options, including the risks benefits and expectations of these options. We recommend micrographic surgery as the most effective and most tissue sparing option for treatment, and the patient agrees to proceed with this.  The patient is aware of the risks, benefits and expectations of this procedure. The patient will be scheduled for this procedure, if not already done so.  - We anticipate the following closure type: Linear closure, such as complex linear closure (CLC)    The patient was discussed with and evaluated by attending physician, Pavan Hamm MD.    Scribe Disclosure:  I,  Lavern Veloz, am serving as a scribe to document services personally performed by Pavan Hamm MD based on data collection and the provider's statements to me.         Attestation signed by Pavan Hamm MD at 6/28/2022 10:01 AM:  Attending Attestation  I attest that the Scribe recorded the interview and exam that I personally performed.  I have reviewed the note and edited it as necessary.  Will measure day of appointment to determine MMS vs. WLE.  Pavan Hamm M.D.  Professor  Director of Dermatologic Surgery  Department of Dermatology  [facility]

## 2022-06-22 NOTE — NURSING NOTE
Dermatology Rooming Note    Noah Wells's goals for this visit include:   Chief Complaint   Patient presents with     Derm Problem     Jesus is have a consult regarding the melanoma on the left upper back      Hallie Welsh, CMA

## 2022-06-29 ENCOUNTER — OFFICE VISIT (OUTPATIENT)
Dept: DERMATOLOGY | Facility: CLINIC | Age: 72
End: 2022-06-29
Payer: COMMERCIAL

## 2022-06-29 VITALS — DIASTOLIC BLOOD PRESSURE: 73 MMHG | SYSTOLIC BLOOD PRESSURE: 113 MMHG

## 2022-06-29 DIAGNOSIS — C43.9 MALIGNANT MELANOMA, UNSPECIFIED SITE (H): Primary | ICD-10-CM

## 2022-06-29 PROCEDURE — 88305 TISSUE EXAM BY PATHOLOGIST: CPT | Mod: TC | Performed by: DERMATOLOGY

## 2022-06-29 PROCEDURE — 11604 EXC TR-EXT MAL+MARG 3.1-4 CM: CPT | Performed by: DERMATOLOGY

## 2022-06-29 PROCEDURE — 88305 TISSUE EXAM BY PATHOLOGIST: CPT | Mod: 26 | Performed by: DERMATOLOGY

## 2022-06-29 PROCEDURE — 12034 INTMD RPR S/TR/EXT 7.6-12.5: CPT | Performed by: DERMATOLOGY

## 2022-06-29 ASSESSMENT — PAIN SCALES - GENERAL: PAINLEVEL: NO PAIN (0)

## 2022-06-29 NOTE — PROGRESS NOTES
[facility] Dermatologic Surgery Excision Note    Case #: 1  Date of Service:  Jun 29, 2022  Surgery: Wide local excision  Staff Surgeon: Pavan Hamm MD  Fellow Surgeon: None.  Resident Surgeon: Abigail Mansfield MD  Nurse: Mela Jorgensen CMA    Tumor Type: Melanoma  Location: Left upper back  Derm-Path Accession #: [id number]    Skin Lesion Size:  2.3 cm x 2.3 cm  Margin: 1 cm  Excision size: 3.3 cm x 3.3 cm  Level of Defect: Fascia  Repair Type: Intermediate linear  Repair Size: 10 cm  Suture Material: 3-0 Vicryl; 4-0 Monocryl 4-0 Prolene    INDICATIONS:  The patient was scheduled for wide local excision of the skin lesion documented above. We discussed the principles of treatment and most likely complications including scarring, bleeding, infection, incomplete excision, wound dehiscence, pain, nerve damage, and recurrence. Informed consent was obtained and the patient underwent the procedure as follows:    PROCEDURE:  With the patient in a prone position, the lesion was outlined with the margin documented above.  The lesion and the necessary margin (excised diameter) was measured and documented as above.  An ellipse was designed around the lesion to conform to relaxed skin tension lines in an effort to minimize scarring and deformity.  The lesion and surrounding skin were prepped with chlorhexidine, draped, and anesthetized with lidocaine with epinephrine. Using a #15 blade, the skin was excised along premarked lines. Suture placed at 12 o'clock for tissue orientation.  The level of the defect is documented as above.  Wound margins were undermined to limit functional deformity/impairment of adjacent structures. Bleeding vessels were controlled with electrocoagulation.  The dermis and subcutaneous tissue were closed with buried vertical mattress sutures using 3-0 Vicryl and 4-0 Monocryl.  Epidermal approximation was meticulously refined with 4-0 Prolene simple running sutures, resulting in a linear  closure with little to no wound tension.  Blood loss was estimated to be less than 5 mL.  The area was coated with petrolatum and covered with a non-adherent dressing followed by gauze and tape. Postoperative instructions were reviewed per protocol.  The patient left alert and fully oriented.    Pavan Hamm MD was immediately available for the entire surgery and was physicially present for the key portions of the procedure.    Scribe Disclosure:  I, Lavern Veloz, am serving as a scribe to document services personally performed by Pavan Hamm MD based on data collection and the provider's statements to me.       Attending attestation:  I was present for key elements of the procedure and immediately available for all other portions of the procedure.  I have reviewed the note and edited it as necessary.    Pavan Hamm M.D.  Professor  Director of Dermatologic Surgery  Department of Dermatology  [facility]    Dermatology Surgery Clinic  [facility] [place] [id number]

## 2022-06-29 NOTE — PATIENT INSTRUCTIONS
Wound care    I will experience scar, bleeding, swelling, pain, crusting and redness. I may experience incomplete removal or recurrence. Risks are bleeding, bruising, swelling, infection, nerve damage, & a large wound. A second procedure may be recommended to obtain the best cosmetic or functional result.       A three month office visit with your Surgeon is recommended for scar evaluation. Please reach out sooner if you have concerns about you surgical site/wound.    Caring for your skin after surgery    After your surgery, a pressure bandage will be placed over the area that has stitches. This is important to prevent bleeding. Please follow these instructions over the next 2 weeks. Following this regimen will help to prevent complications as your wound heals.     For the first 48 hours after your surgery:    Leave the pressure dressing on and keep it dry. If it should come loose, you may re-tape it, but do not take it off.  Relax and take it easy. Do not do any vigorous exercise or heavy lifting. This could cause the wound to bleed.  Post-Operative pain is usually mild. If you are able to take tylenol, You may take plain or extra-strength Tylenol (acetaminophen) As directed on the bottle (do not take more than 4,000mg in one day). If you are able to take ibuprofen, you can alternate the tylenol and ibuprofen.   Avoid alcohol as this may increase your tendency to bleed.   You may put an ice pack around the bandaged area for 20 minutes at a time as needed. This may help reduce swelling, bruising, and pain. Make sure the ice pack is waterproof so that the pressure bandage doesn t get wet.  If the wound is on the face try to sleep with your head elevated. Either in a recliner or propped up in bed, this will decrease swelling around the eyes.   You may see a small amount of drainage or blood on your pressure bandage. This is normal. However:  If drainage or bleeding continues or saturates the bandage, you will need to  apply firm pressure over the bandage with a piece of gauze for 15 minutes.  If bleeding continues after applying pressure for 15 minutes, apply an ice pack to the bandaged area for 15 minutes.  If bleeding still continues, call our office or go to the nearest emergency room.    Remove pressure dressing 48 hours after surgery:    Carefully remove the pressure bandage. If it seems sticky or too difficult to get off, you may need to soak it off in the shower.  After the pressure dressing is removed, you may shower and get the wound wet. However, Do Not let the forceful stream of the shower hit the wound directly.  Follow these wound care and dressing change instructions:   In the shower was the surgical site last with its own separate was cloth.  You may allow water to run over the site. Take a clean wash cloth wet with soapy warm water and gently pat the suture site to help remove any crust or drainage.   Do Not rub or scrub the site    After site is clean pat dry and apply a thin layer of Vaseline ointment  over the suture site with a cotton swab or clean finger.   Cover the suture site with Telfa (non-stick) dressing. You may tape a piece of gauze over the Telfa for extra protection if you wish.  Continue wound care at least once a day, twice if you are active or around a contaminated environment.  Continue daily wound care until your surgical site is completely healed. To determine this, around 2 weeks post procedure you can take a cotton swab with a small amount of Hydrogen peroxide and roll it on the suture site. If the site bubbles and turns white your wound is still healing. Please continue wound care until the area no longer bubbles up from the hydrogen peroxide.       Stitches will need to be removed in 2 + weeks.   July 13 or after        Follow up will be a 3 month scar evaluation either in person or via a telephone visit with you sending in a photo via [employer]. Unless you have been told to follow up sooner  or if you have concerns and would like to be see sooner. Please call or send us in a [employer] message if possible and attach a photo.        What to expect:    The first couple of days your wound may be tender and may bleed slightly when doing wound care.  There may be swelling and bruising around the wound, especially if it is near the eyes. For your comfort, you may apply ice or cold compresses to the bruises after your have removed the pressure bandage.  The area around your wound may be numb for several weeks or even months.  You may experience periodic sharp pain or mild itching around the wound as it heals.   The suture line will look dark for a while but will lighten over time.       When to call us:    You have bleeding that will not stop after applying pressure and ice.  You have pain that is not controlled with Tylenol (acetaminophen.)  You have signs or symptoms of an infection such as:  Fever over 100 degrees Fahrenheit  Redness, warmth or foul-smelling drainage from the wound  If you have any questions, or are not sure how to take care of the wound.    Phone numbers:    During business hours (M-F 8:00-4:30 p.m.)  [facility]-  [phone] Option 1 appt. desk  [phone]  Option 3 nurse triage line  ---------------------------------------------------------  Evenings/Weekends/Holidays  Hospital - [phone]   TTY for hearing [phone]  *Ask  to page dermatologist on-call  Emergency Pfnl-[phone]  TTY for hearing impaired- [phone]

## 2022-06-29 NOTE — NURSING NOTE
Dermatology Rooming Note    Noah Wells's goals for this visit include:   Chief Complaint   Patient presents with     Malignant Melanoma (Choroid) Evaluation     Melanoma on back      Mela Marin, Visit Facilitator

## 2022-06-29 NOTE — LETTER
6/29/2022       RE: Noah Wells  [address]  [facility] [address]     Dear Colleague,    Thank you for referring your patient, Noah Wells, to the [facility] DERMATOLOGIC SURGERY CLINIC [place] at [facility]. Please see a copy of my visit note below.    [facility] Dermatologic Surgery Excision Note    Case #: 1  Date of Service:  Jun 29, 2022  Surgery: Wide local excision  Staff Surgeon: Pavan Hamm MD  Fellow Surgeon: None.  Resident Surgeon: Abigail Mansfield MD  Nurse: Mela Jorgensen CMA    Tumor Type: Melanoma  Location: Left upper back  Derm-Path Accession #: [id number]    Skin Lesion Size:  2.3 cm x 2.3 cm  Margin: 1 cm  Excision size: 3.3 cm x 3.3 cm  Level of Defect: Fascia  Repair Type: Intermediate linear  Repair Size: 10 cm  Suture Material: 3-0 Vicryl; 4-0 Monocryl 4-0 Prolene    INDICATIONS:  The patient was scheduled for wide local excision of the skin lesion documented above. We discussed the principles of treatment and most likely complications including scarring, bleeding, infection, incomplete excision, wound dehiscence, pain, nerve damage, and recurrence. Informed consent was obtained and the patient underwent the procedure as follows:    PROCEDURE:  With the patient in a prone position, the lesion was outlined with the margin documented above.  The lesion and the necessary margin (excised diameter) was measured and documented as above.  An ellipse was designed around the lesion to conform to relaxed skin tension lines in an effort to minimize scarring and deformity.  The lesion and surrounding skin were prepped with chlorhexidine, draped, and anesthetized with lidocaine with epinephrine. Using a #15 blade, the skin was excised along premarked lines. Suture placed at 12 o'clock for tissue orientation.  The level of the defect is documented as above.  Wound margins were undermined to limit functional  deformity/impairment of adjacent structures. Bleeding vessels were controlled with electrocoagulation.  The dermis and subcutaneous tissue were closed with buried vertical mattress sutures using 3-0 Vicryl and 4-0 Monocryl.  Epidermal approximation was meticulously refined with 4-0 Prolene simple running sutures, resulting in a linear closure with little to no wound tension.  Blood loss was estimated to be less than 5 mL.  The area was coated with petrolatum and covered with a non-adherent dressing followed by gauze and tape. Postoperative instructions were reviewed per protocol.  The patient left alert and fully oriented.    Pavan Hamm MD was immediately available for the entire surgery and was physicially present for the key portions of the procedure.    Scribe Disclosure:  I, Lavern Veloz, am serving as a scribe to document services personally performed by Pavan Hamm MD based on data collection and the provider's statements to me.       Attending attestation:  I was present for key elements of the procedure and immediately available for all other portions of the procedure.  I have reviewed the note and edited it as necessary.    Pavan Hamm M.D.  Professor  Director of Dermatologic Surgery  Department of Dermatology  [facility]    Dermatology Surgery Clinic  [facility]  [address] [place], [place] [id number]

## 2022-06-29 NOTE — NURSING NOTE
Surgical wound on back was cleansed and dressed with Vaseline, Telfa, and tape.     Supplies given to patient to care for site at home are Vaseline, Guaze, Qtips, and tape, suture removal kit.     Patient will have wife take stitches out at home and call if and complications.     Wound care was gone over and all of the patients questions were answered. Patient verbalized understanding and agreement.     Mela Jorgensen CMA

## 2022-06-30 PROBLEM — C43.59 MALIGNANT MELANOMA OF SKIN OF TRUNK (H): Status: ACTIVE | Noted: 2022-06-30

## 2022-07-05 LAB
PATH REPORT.COMMENTS IMP SPEC: NORMAL
PATH REPORT.COMMENTS IMP SPEC: NORMAL
PATH REPORT.FINAL DX SPEC: NORMAL
PATH REPORT.GROSS SPEC: NORMAL
PATH REPORT.MICROSCOPIC SPEC OTHER STN: NORMAL
PATH REPORT.RELEVANT HX SPEC: NORMAL

## 2022-10-16 ENCOUNTER — HEALTH MAINTENANCE LETTER (OUTPATIENT)
Age: 72
End: 2022-10-16

## 2023-01-26 ENCOUNTER — PREP FOR PROCEDURE (OUTPATIENT)
Dept: GASTROENTEROLOGY | Facility: CLINIC | Age: 73
End: 2023-01-26
Payer: COMMERCIAL

## 2023-01-26 DIAGNOSIS — Z86.0100 HISTORY OF COLONIC POLYPS: Primary | ICD-10-CM

## 2023-01-26 NOTE — PROGRESS NOTES
Pt returned call to schedule procedure that was initially requested by Dr Gregg in April 2022. Pt states he had a busy schedule and was unable to schedule at that time, but would like to schedule now.    Agreed upon April 14th, 2023 at [facility] location    Preop Plan: not needed, pt having no sedation    Med Review    Blood thinner -  none  ASA - none  Diabetic - none    Patient Education r/t procedure: mychart    Does patient have any history of gastric bypass/gastric surgery/altered panc/bili anatomy? none    A pre-op nurse will call 1-2 days prior to the procedure.    NPO/Prep:   Miralax bowel prep discussed, pt is familiar with this prep.    Verbalized understanding of all instructions. All questions answered.     Procedure order placed, message routed to OR

## 2023-03-26 ENCOUNTER — HEALTH MAINTENANCE LETTER (OUTPATIENT)
Age: 73
End: 2023-03-26

## 2023-04-07 ENCOUNTER — PATIENT OUTREACH (OUTPATIENT)
Dept: GASTROENTEROLOGY | Facility: CLINIC | Age: 73
End: 2023-04-07
Payer: COMMERCIAL

## 2023-04-07 NOTE — TELEPHONE ENCOUNTER
Pt called with question about the [employer] message that asked for a pre op exam. Pt is not receiving sedation and therefore does not need a pre op. I will send a message to PAN with this update as well.    Kassie Shaikh, RN, BSN,   [facility]  Care coordinator

## 2023-04-11 NOTE — TELEPHONE ENCOUNTER
OR inquiring about procedure scheduled without sedation. Dr Ariza would be in agreement with delaying procedure to be done in [facility] or [facility] next available. Left pt message with this information, but currently will keep on 4/14 as scheduled. OR is aware       Await prearranged cardiac evaluation with cardiologist as requested by pcp and therefore by PAST  * Cardiologist to determine when or if to stop aspirin (has 2 cardiac stents)  * Await stress test and echo from cardiologist  * Instructed to take normal am dose of     the am of surgery Await prearranged cardiac evaluation with cardiologist as requested by pcp and therefore by PAST  * Cardiologist to determine when or if to stop aspirin (has 2 cardiac stents)  * Await stress test and echo from cardiologist  * Instructed to take normal am dose of Bystolic, omeprazole, sertraline, and Breo the am of surgery  * Instructed to stop metformin 24 hours before surgery Await prearranged cardiac evaluation with cardiologist as requested by pcp and therefore by PAST  * Cardiologist to determine when or if to stop aspirin (has 2 cardiac stents)--ADDENDUM: 10-8-21 patient told by cardiologist to stop aspirin 1 week before surgery  * Await stress test and echo from cardiologist  * Instructed to take normal am dose of Bystolic, omeprazole, sertraline, and Breo the am of surgery  * Instructed to stop metformin 24 hours before surgery

## 2023-04-13 NOTE — TELEPHONE ENCOUNTER
Spoke with patient, he would prefer procedure at the [facility] location, states that [facility] is farther. We discussed rescheduling for a date that Dr Ariza is attending at the , pt states if he rescheduled it would be for the fall. Delaying procedure further. Also stated that Friday procedure is best d/t his work schedule Ultimately decided to keep on with plans for procedure on 4/14. Message routed to PAN to call pt for arrival time, etc.

## 2023-04-14 ENCOUNTER — ANESTHESIA EVENT (OUTPATIENT)
Dept: SURGERY | Facility: CLINIC | Age: 73
End: 2023-04-14
Payer: COMMERCIAL

## 2023-04-14 ENCOUNTER — HOSPITAL ENCOUNTER (OUTPATIENT)
Facility: CLINIC | Age: 73
Discharge: HOME | End: 2023-04-14
Attending: INTERNAL MEDICINE | Admitting: INTERNAL MEDICINE
Payer: COMMERCIAL

## 2023-04-14 ENCOUNTER — ANESTHESIA (OUTPATIENT)
Dept: SURGERY | Facility: CLINIC | Age: 73
End: 2023-04-14
Payer: COMMERCIAL

## 2023-04-14 VITALS
HEART RATE: 69 BPM | DIASTOLIC BLOOD PRESSURE: 72 MMHG | TEMPERATURE: 97.6 F | HEIGHT: 73 IN | RESPIRATION RATE: 14 BRPM | WEIGHT: 162.26 LBS | OXYGEN SATURATION: 100 % | BODY MASS INDEX: 21.5 KG/M2 | SYSTOLIC BLOOD PRESSURE: 113 MMHG

## 2023-04-14 PROCEDURE — 88305 TISSUE EXAM BY PATHOLOGIST: CPT | Mod: 26 | Performed by: STUDENT IN AN ORGANIZED HEALTH CARE EDUCATION/TRAINING PROGRAM

## 2023-04-14 PROCEDURE — 88305 TISSUE EXAM BY PATHOLOGIST: CPT | Mod: TC | Performed by: INTERNAL MEDICINE

## 2023-04-14 PROCEDURE — 272N000001 HC OR GENERAL SUPPLY STERILE: Performed by: INTERNAL MEDICINE

## 2023-04-14 PROCEDURE — 360N000076 HC SURGERY LEVEL 3, PER MIN: Performed by: INTERNAL MEDICINE

## 2023-04-14 PROCEDURE — 710N000012 HC RECOVERY PHASE 2, PER MINUTE: Performed by: INTERNAL MEDICINE

## 2023-04-14 PROCEDURE — 250N000009 HC RX 250: Performed by: INTERNAL MEDICINE

## 2023-04-14 PROCEDURE — 999N000141 HC STATISTIC PRE-PROCEDURE NURSING ASSESSMENT: Performed by: INTERNAL MEDICINE

## 2023-04-14 RX ORDER — FENTANYL CITRATE 50 UG/ML
25 INJECTION, SOLUTION INTRAMUSCULAR; INTRAVENOUS EVERY 5 MIN PRN
Status: DISCONTINUED | OUTPATIENT
Start: 2023-04-14 | End: 2023-04-14 | Stop reason: HOSPADM

## 2023-04-14 RX ORDER — ONDANSETRON 2 MG/ML
4 INJECTION INTRAMUSCULAR; INTRAVENOUS EVERY 30 MIN PRN
Status: DISCONTINUED | OUTPATIENT
Start: 2023-04-14 | End: 2023-04-14 | Stop reason: HOSPADM

## 2023-04-14 RX ORDER — LABETALOL HYDROCHLORIDE 5 MG/ML
10 INJECTION, SOLUTION INTRAVENOUS
Status: DISCONTINUED | OUTPATIENT
Start: 2023-04-14 | End: 2023-04-14 | Stop reason: HOSPADM

## 2023-04-14 RX ORDER — PROCHLORPERAZINE MALEATE 5 MG
5 TABLET ORAL EVERY 6 HOURS PRN
Status: CANCELLED | OUTPATIENT
Start: 2023-04-14

## 2023-04-14 RX ORDER — HYDRALAZINE HYDROCHLORIDE 20 MG/ML
2.5-5 INJECTION INTRAMUSCULAR; INTRAVENOUS EVERY 10 MIN PRN
Status: DISCONTINUED | OUTPATIENT
Start: 2023-04-14 | End: 2023-04-14 | Stop reason: HOSPADM

## 2023-04-14 RX ORDER — HYDROMORPHONE HCL IN WATER/PF 6 MG/30 ML
0.2 PATIENT CONTROLLED ANALGESIA SYRINGE INTRAVENOUS EVERY 5 MIN PRN
Status: DISCONTINUED | OUTPATIENT
Start: 2023-04-14 | End: 2023-04-14 | Stop reason: HOSPADM

## 2023-04-14 RX ORDER — FLUMAZENIL 0.1 MG/ML
0.2 INJECTION, SOLUTION INTRAVENOUS
Status: CANCELLED | OUTPATIENT
Start: 2023-04-14 | End: 2023-04-15

## 2023-04-14 RX ORDER — FENTANYL CITRATE 50 UG/ML
50 INJECTION, SOLUTION INTRAMUSCULAR; INTRAVENOUS EVERY 5 MIN PRN
Status: DISCONTINUED | OUTPATIENT
Start: 2023-04-14 | End: 2023-04-14 | Stop reason: HOSPADM

## 2023-04-14 RX ORDER — NALOXONE HYDROCHLORIDE 0.4 MG/ML
0.2 INJECTION, SOLUTION INTRAMUSCULAR; INTRAVENOUS; SUBCUTANEOUS
Status: CANCELLED | OUTPATIENT
Start: 2023-04-14

## 2023-04-14 RX ORDER — ONDANSETRON 2 MG/ML
4 INJECTION INTRAMUSCULAR; INTRAVENOUS
Status: DISCONTINUED | OUTPATIENT
Start: 2023-04-14 | End: 2023-04-14 | Stop reason: HOSPADM

## 2023-04-14 RX ORDER — ONDANSETRON 4 MG/1
4 TABLET, ORALLY DISINTEGRATING ORAL EVERY 6 HOURS PRN
Status: CANCELLED | OUTPATIENT
Start: 2023-04-14

## 2023-04-14 RX ORDER — LIDOCAINE 40 MG/G
CREAM TOPICAL
Status: DISCONTINUED | OUTPATIENT
Start: 2023-04-14 | End: 2023-04-14 | Stop reason: HOSPADM

## 2023-04-14 RX ORDER — NALOXONE HYDROCHLORIDE 0.4 MG/ML
0.4 INJECTION, SOLUTION INTRAMUSCULAR; INTRAVENOUS; SUBCUTANEOUS
Status: CANCELLED | OUTPATIENT
Start: 2023-04-14

## 2023-04-14 RX ORDER — SODIUM CHLORIDE, SODIUM LACTATE, POTASSIUM CHLORIDE, CALCIUM CHLORIDE 600; 310; 30; 20 MG/100ML; MG/100ML; MG/100ML; MG/100ML
INJECTION, SOLUTION INTRAVENOUS CONTINUOUS
Status: DISCONTINUED | OUTPATIENT
Start: 2023-04-14 | End: 2023-04-14 | Stop reason: HOSPADM

## 2023-04-14 RX ORDER — HYDROMORPHONE HCL IN WATER/PF 6 MG/30 ML
0.4 PATIENT CONTROLLED ANALGESIA SYRINGE INTRAVENOUS EVERY 5 MIN PRN
Status: DISCONTINUED | OUTPATIENT
Start: 2023-04-14 | End: 2023-04-14 | Stop reason: HOSPADM

## 2023-04-14 RX ORDER — ONDANSETRON 2 MG/ML
4 INJECTION INTRAMUSCULAR; INTRAVENOUS EVERY 6 HOURS PRN
Status: CANCELLED | OUTPATIENT
Start: 2023-04-14

## 2023-04-14 RX ORDER — ONDANSETRON 4 MG/1
4 TABLET, ORALLY DISINTEGRATING ORAL EVERY 30 MIN PRN
Status: DISCONTINUED | OUTPATIENT
Start: 2023-04-14 | End: 2023-04-14 | Stop reason: HOSPADM

## 2023-04-14 ASSESSMENT — ACTIVITIES OF DAILY LIVING (ADL)
ADLS_ACUITY_SCORE: 35
ADLS_ACUITY_SCORE: 35

## 2023-04-14 NOTE — BRIEF OP NOTE
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    Pre-operative diagnosis: History of colonic polyps [Z86.010]  Post-operative diagnosis Same as pre-operative diagnosis    Procedure: Procedure(s):  COLONOSCOPY with polypectomy  Surgeon: Surgeon(s) and Role:     * Seth Arzia MD - Primary     * Fara Santiago MD - Fellow - Assisting  Anesthesia: None   Estimated Blood Loss: None    Drains: None  Specimens:   ID Type Source Tests Collected by Time Destination   1 : Appendiceal orifice polyp Polyp Other SURGICAL PATHOLOGY EXAM Seth Ariza MD 4/14/2023 12:35 PM      Findings:     - A 7 mm sessile polyp (JNET type 1, Leslie 0-IIa) was found at the appendiceal orifice. This was removed with a cold snare and retrieved.   - Scattered diverticulosis in the sigmoid colon.   - Non-bleeding internal hemorrhoids.   - The examination was otherwise normal.    Complications: None.  Implants: * No implants in log *    Recommendations  - Procedure was completed without sedation. Observe patient briefly in the same day observation unit with plans for discharge to home later today   - Will send a letter to the patient once pathology results come back   - Given patient's age and not high risk appearing polyps, will not recommend further surveillance colonoscopies  - The findings and recommendations were discussed with the patient and their family

## 2023-04-14 NOTE — OR NURSING
Patient did not receive any anesthesia or any medications during procedure, does not require anesthesia sign out.     Dr. Santiago called, stated he does not need to see patient and that patient is ok to discharge. Instructed patient to take tylenol if he has any pain, do not take any NSAIDs for 3 days.

## 2023-04-14 NOTE — ANESTHESIA PREPROCEDURE EVALUATION
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  He appears otherwise healthy.    Patient is adamant that he does not need or want ANY medications during his colonoscopy.  I said that although that might be technically possible, I did not recommend it, but he was very adamant that he did not want anesthesia involved in his care.  The endoscopist acknowledged that there was nothing about the procedure that would technically require sedative /analgesic meds, although he also thinks it very unlikely that the patient will be able to tolerate a full colonoscopy & polyp removal without meds.  I therefore talked to the patient about MAC anesthesia / sedation & consented him for sedation/ intubation in the event of an emergency, but will plan on not being involved unless called urgently to provide care.    Past Medical History:   Diagnosis Date     Allergic rhinitis      Heel fracture     fall from a tree - bilateral heel fractures requiring surgery      Past Surgical History:   Procedure Laterality Date     COLONOSCOPY  10/11/13    2 polyps, rpt 5 yrs, Dr. Muhammad     COLONOSCOPY N/A 2022    Procedure: COLONOSCOPY, WITH POLYPECTOMY;  Surgeon: Reema Gregg MD;  Location: [facility] OR     ORTHOPEDIC SURGERY  age 23    feet      Allergies   Allergen Reactions     Penicillins Swelling      Social History     Tobacco Use     Smoking status: Former     Packs/day: 1.00     Years: 12.00     Pack years: 12.00     Types: Cigarettes     Quit date: 1988     Years since quittin.6     Smokeless tobacco: Former   Vaping Use     Vaping status: Not on file   Substance Use Topics     Alcohol use: Yes     Comment: [facility]      Wt Readings  from Last 1 Encounters:   04/07/22 77.1 kg (170 lb)           Physical Exam    Airway        Mallampati: II   TM distance: > 3 FB   Neck ROM: full   Mouth opening: > 3 cm    Respiratory Devices and Support         Dental       (+) Minor Abnormalities - some fillings, tiny chips      Cardiovascular   cardiovascular exam normal          Pulmonary   pulmonary exam normal                OUTSIDE LABS:  CBC:   Lab Results   Component Value Date    WBC 5.2 02/18/2022    HGB 13.8 02/18/2022    HCT 41.7 02/18/2022     02/18/2022     BMP:   Lab Results   Component Value Date     02/18/2022     02/08/2018    POTASSIUM 4.1 02/18/2022    POTASSIUM 3.4 02/08/2018    CHLORIDE 110 (H) 02/18/2022    CHLORIDE 103 02/08/2018    CO2 21 02/18/2022    CO2 28 02/08/2018    BUN 19 02/18/2022    BUN 13 02/08/2018    CR 0.76 02/18/2022    CR 0.71 02/08/2018    GLC 92 02/18/2022    GLC 84 02/08/2018     COAGS: No results found for: PTT, INR, FIBR  POC: No results found for: BGM, HCG, HCGS  HEPATIC:   Lab Results   Component Value Date    ALBUMIN 3.7 02/18/2022    PROTTOTAL 7.0 02/18/2022    ALT 17 02/18/2022    AST 18 02/18/2022    ALKPHOS 83 02/18/2022    BILITOTAL 0.4 02/18/2022     OTHER:   Lab Results   Component Value Date    SANAM 9.2 02/18/2022       Anesthesia Plan    ASA Status:  1   NPO Status:  NPO Appropriate    Anesthesia Type: MAC.     - Reason for MAC: immobility needed, straight local not clinically adequate   Induction: Intravenous, Propofol.   Maintenance: TIVA.        Consents    Anesthesia Plan(s) and associated risks, benefits, and realistic alternatives discussed. Questions answered and patient/representative(s) expressed understanding.     - Discussed: Risks, Benefits and Alternatives for BOTH SEDATION and the PROCEDURE were discussed     - Discussed with:  Patient      - Extended Intubation/Ventilatory Support Discussed: No.      - Patient is DNR/DNI Status: No    Use of blood products discussed: No .      Postoperative Care    Pain management: IV analgesics.   PONV prophylaxis: Ondansetron (or other 5HT-3), Dexamethasone or Solumedrol     Comments:    Other Comments: Patient is adamant that he does not need or want ANY medications during his colonoscopy.  I said that although that might be technically possible, I did not recommend it, but he was very adamant that he did not want anesthesia involved in his care.  The endoscopist acknowledged that there was nothing about the procedure that would technically require sedative /analgesic meds, although he also thinks it very unlikely that the patient will be able to tolerate a full colonoscopy & polyp removal without meds.  I therefore talked to the patient about MAC anesthesia / sedation & consented him for sedation/ intubation in the event of an emergency, but will plan on not being involved unless called urgently to provide care.  LORENZO Cox MD  Clinical   Anesthesia / Critical Care  *[phone]       H&P reviewed: Unable to attach H&P to encounter due to EHR limitations. H&P Update: appropriate H&P reviewed, patient examined. No interval changes since H&P (within 30 days).         Jose Ramon Cox MD

## 2023-04-14 NOTE — DISCHARGE INSTRUCTIONS
[facility] Colonoscopy/Flexible Sigmoidoscopy with Monitored Anesthesia Care  For 24 hours after your procedure  Sedation:  Get plenty of rest. A responsible adult must stay with you for at least 24 hours after you leave the hospital.   Do not drive or use heavy equipment. If you have weakness or tingling, don't drive or use heavy equipment until this feeling goes away.  Do not drink alcohol.  Avoid strenuous or risky activities. Ask for help when climbing stairs.   You may feel lightheaded. IF so, sit for a few minutes before standing. Have someone help you get up.   If you have nausea (feel sick to your stomach): Drink only clear liquids such as apple juice, ginger ale, broth or 7-Up. Rest may also help. Be sure to drink enough fluids. Move to a regular diet as you feel able.  You may have a slight fever. Call the doctor if your fever is over 100 F (37.7 C) (taken under the tongue) or lasts longer than 24 hours.  You may have a dry mouth, a sore throat, muscle aches or trouble sleeping. These should go away after 24 hours.  Do not make important or legal decisions.   Procedural:  You may return to your normal diet and medicines.  Air was placed in your colon during the exam in order to see it. Walking helps to pass the air.  You may take Tylenol (acetaminophen) for pain unless your doctor has told you not to.   Do not take aspirin or ibuprofen (Advil, Motrin, or other anti-inflammatory  drugs) for _____ days.  Call your doctor for any of the following  Unusual pain in belly or chest pain not relieved with passing air.  More than 1 to 2 Tablespoons of bleeding from your rectum.  Signs of infection (fever, growing tenderness at the surgery site, a large amount of drainage or bleeding, severe pain, foul-smelling drainage, redness, swelling).  It has been over 8 to 10 hours since surgery and you are still not able to urinate (pass water).  Headache for over 24 hours.  Numbness, tingling or weakness the day after surgery (if  you had spinal anesthesia).  Follow-up:  ____ We took small tissue samples or polyps to study. Your doctor will call you with the results within two weeks.  If you have severe pain, bleeding, or shortness of breath, go to an emergency room.  If you have questions, call:  Endoscopy: Monday to Friday, 8 a.m. to 4:30 p.m. [phone], option 2  After hours: Hospital  [phone] (Ask for the GI fellow on call)

## 2023-04-17 LAB
COLONOSCOPY: NORMAL
PATH REPORT.COMMENTS IMP SPEC: NORMAL
PATH REPORT.COMMENTS IMP SPEC: NORMAL
PATH REPORT.FINAL DX SPEC: NORMAL
PATH REPORT.GROSS SPEC: NORMAL
PATH REPORT.MICROSCOPIC SPEC OTHER STN: NORMAL
PATH REPORT.RELEVANT HX SPEC: NORMAL
PHOTO IMAGE: NORMAL

## 2023-04-18 NOTE — RESULT ENCOUNTER NOTE
Pathology from colonoscopy reviewed. See report for details. One SSA removed. No further screening colonoscopies recommended due to patient's age.    Seth Ariza MD  [facility]  Division of Gastroenterology and Hepatology  [facility] [id number] [place], [place] [address]

## 2024-06-01 ENCOUNTER — HEALTH MAINTENANCE LETTER (OUTPATIENT)
Age: 74
End: 2024-06-01

## 2025-06-14 ENCOUNTER — HEALTH MAINTENANCE LETTER (OUTPATIENT)
Age: 75
End: 2025-06-14

## (undated) DEVICE — PAD CHUX UNDERPAD 23X24" 7136

## (undated) DEVICE — ENDO FORCEP SPIKED SERRATED SHAFT JUMBO 239CM G56998

## (undated) DEVICE — SNARE CAPIVATOR ROUND COLD SNR BX10 M00561101

## (undated) DEVICE — SOL WATER IRRIG 500ML BOTTLE 2F7113

## (undated) DEVICE — KIT ENDO FIRST STEP DISINFECTANT 200ML W/POUCH EP-4

## (undated) DEVICE — SOL WATER IRRIG 1000ML BOTTLE 2F7114

## (undated) DEVICE — ENDO CAP AND TUBING STERILE FOR ENDOGATOR  100130

## (undated) DEVICE — ENDO TRAP POLYP E-TRAP 00711099

## (undated) DEVICE — ENDO FORCEP BX CAPTURA PRO SPIKE G50696

## (undated) DEVICE — SPECIMEN CONTAINER 3OZ W/FORMALIN 59901

## (undated) DEVICE — ATTACHMENT CAP DISTAL REVEAL 15.0MM BX00711774

## (undated) DEVICE — TUBING SUCTION 12"X1/4" N612

## (undated) DEVICE — SUCTION MANIFOLD NEPTUNE 2 SYS 4 PORT 0702-020-000

## (undated) DEVICE — WIPE PREMOIST CLEANSING WASHCLOTHS 7988

## (undated) DEVICE — KIT CONNECTOR FOR OLYMPUS ENDOSCOPES DEFENDO 100310

## (undated) DEVICE — ENDO TUBING CO2 SMARTCAP STERILE DISP 100145CO2EXT

## (undated) DEVICE — DRSG GAUZE 4X4" TRAY 6939

## (undated) DEVICE — GOWN IMPERVIOUS 2XL BLUE

## (undated) DEVICE — PACK ENDOSCOPY GI CUSTOM UMMC

## (undated) DEVICE — SUCTION MANIFOLD NEPTUNE 2 SYS 1 PORT 702-025-000

## (undated) DEVICE — ENDO SNARE EXACTO COLD 9MM LOOP 2.4MMX230CM 00711115

## (undated) DEVICE — KIT ENDO TURNOVER/PROCEDURE CARRY-ON 101822

## (undated) RX ORDER — FENTANYL CITRATE 50 UG/ML
INJECTION, SOLUTION INTRAMUSCULAR; INTRAVENOUS
Status: DISPENSED
Start: 2023-04-14

## (undated) RX ORDER — SIMETHICONE 40MG/0.6ML
SUSPENSION, DROPS(FINAL DOSAGE FORM)(ML) ORAL
Status: DISPENSED
Start: 2023-04-14